# Patient Record
Sex: FEMALE | Race: WHITE | NOT HISPANIC OR LATINO | Employment: OTHER | URBAN - METROPOLITAN AREA
[De-identification: names, ages, dates, MRNs, and addresses within clinical notes are randomized per-mention and may not be internally consistent; named-entity substitution may affect disease eponyms.]

---

## 2017-01-19 ENCOUNTER — APPOINTMENT (OUTPATIENT)
Dept: LAB | Facility: HOSPITAL | Age: 67
End: 2017-01-19
Attending: INTERNAL MEDICINE

## 2017-01-19 DIAGNOSIS — I10 ESSENTIAL HYPERTENSION: ICD-10-CM

## 2017-01-19 DIAGNOSIS — Z11.59 NEED FOR HEPATITIS C SCREENING TEST: ICD-10-CM

## 2017-01-19 DIAGNOSIS — E53.9 VITAMIN B DEFICIENCY: ICD-10-CM

## 2017-01-19 DIAGNOSIS — E55.9 VITAMIN D DEFICIENCY: ICD-10-CM

## 2017-01-19 DIAGNOSIS — E78.5 DYSLIPIDEMIA: ICD-10-CM

## 2017-01-19 DIAGNOSIS — E78.00 PURE HYPERCHOLESTEROLEMIA: Primary | ICD-10-CM

## 2017-01-19 DIAGNOSIS — F41.1 GENERALIZED ANXIETY DISORDER: ICD-10-CM

## 2017-01-19 DIAGNOSIS — K58.9 IRRITABLE BOWEL SYNDROME WITHOUT DIARRHEA: ICD-10-CM

## 2017-01-19 DIAGNOSIS — R73.9 HYPERGLYCEMIA: ICD-10-CM

## 2017-01-19 LAB
25(OH)D3 SERPL-MCNC: 66 NG/ML
ALBUMIN SERPL-MCNC: 4.4 G/DL (ref 3.2–4.8)
ALBUMIN/GLOB SERPL: 1.7 G/DL (ref 1.5–2.5)
ALP SERPL-CCNC: 82 U/L (ref 25–100)
ALT SERPL W P-5'-P-CCNC: 29 U/L (ref 7–40)
ANION GAP SERPL CALCULATED.3IONS-SCNC: 8 MMOL/L (ref 3–11)
ARTICHOKE IGE QN: 135 MG/DL (ref 0–130)
AST SERPL-CCNC: 23 U/L (ref 0–33)
BASOPHILS # BLD AUTO: 0.01 10*3/MM3 (ref 0–0.2)
BASOPHILS NFR BLD AUTO: 0.2 % (ref 0–1)
BILIRUB SERPL-MCNC: 0.6 MG/DL (ref 0.3–1.2)
BUN BLD-MCNC: 20 MG/DL (ref 9–23)
BUN/CREAT SERPL: 33.3 (ref 7–25)
CALCIUM SPEC-SCNC: 10.7 MG/DL (ref 8.7–10.4)
CHLORIDE SERPL-SCNC: 104 MMOL/L (ref 99–109)
CHOLEST SERPL-MCNC: 240 MG/DL (ref 0–200)
CO2 SERPL-SCNC: 31 MMOL/L (ref 20–31)
CREAT BLD-MCNC: 0.6 MG/DL (ref 0.6–1.3)
DEPRECATED RDW RBC AUTO: 44.6 FL (ref 37–54)
EOSINOPHIL # BLD AUTO: 0.07 10*3/MM3 (ref 0.1–0.3)
EOSINOPHIL NFR BLD AUTO: 1.2 % (ref 0–3)
ERYTHROCYTE [DISTWIDTH] IN BLOOD BY AUTOMATED COUNT: 13.9 % (ref 11.3–14.5)
GFR SERPL CREATININE-BSD FRML MDRD: 100 ML/MIN/1.73
GLOBULIN UR ELPH-MCNC: 2.6 GM/DL
GLUCOSE BLD-MCNC: 70 MG/DL (ref 70–100)
HBA1C MFR BLD: 5.5 % (ref 4.8–5.6)
HCT VFR BLD AUTO: 41.7 % (ref 34.5–44)
HCV AB SER DONR QL: NORMAL
HDLC SERPL-MCNC: 68 MG/DL (ref 40–60)
HGB BLD-MCNC: 13.6 G/DL (ref 11.5–15.5)
IMM GRANULOCYTES # BLD: 0.01 10*3/MM3 (ref 0–0.03)
IMM GRANULOCYTES NFR BLD: 0.2 % (ref 0–0.6)
LYMPHOCYTES # BLD AUTO: 1.78 10*3/MM3 (ref 0.6–4.8)
LYMPHOCYTES NFR BLD AUTO: 30.5 % (ref 24–44)
MCH RBC QN AUTO: 28.3 PG (ref 27–31)
MCHC RBC AUTO-ENTMCNC: 32.6 G/DL (ref 32–36)
MCV RBC AUTO: 86.9 FL (ref 80–99)
MONOCYTES # BLD AUTO: 0.55 10*3/MM3 (ref 0–1)
MONOCYTES NFR BLD AUTO: 9.4 % (ref 0–12)
NEUTROPHILS # BLD AUTO: 3.41 10*3/MM3 (ref 1.5–8.3)
NEUTROPHILS NFR BLD AUTO: 58.5 % (ref 41–71)
PLATELET # BLD AUTO: 197 10*3/MM3 (ref 150–450)
PMV BLD AUTO: 12.2 FL (ref 6–12)
POTASSIUM BLD-SCNC: 5.1 MMOL/L (ref 3.5–5.5)
PROT SERPL-MCNC: 7 G/DL (ref 5.7–8.2)
RBC # BLD AUTO: 4.8 10*6/MM3 (ref 3.89–5.14)
SODIUM BLD-SCNC: 143 MMOL/L (ref 132–146)
TRIGL SERPL-MCNC: 65 MG/DL (ref 0–150)
TSH SERPL DL<=0.05 MIU/L-ACNC: 1.29 MIU/ML (ref 0.35–5.35)
WBC NRBC COR # BLD: 5.83 10*3/MM3 (ref 3.5–10.8)

## 2017-01-19 PROCEDURE — 80061 LIPID PANEL: CPT | Performed by: INTERNAL MEDICINE

## 2017-01-19 PROCEDURE — 36415 COLL VENOUS BLD VENIPUNCTURE: CPT | Performed by: INTERNAL MEDICINE

## 2017-01-19 PROCEDURE — 80053 COMPREHEN METABOLIC PANEL: CPT | Performed by: INTERNAL MEDICINE

## 2017-01-19 PROCEDURE — 85025 COMPLETE CBC W/AUTO DIFF WBC: CPT | Performed by: INTERNAL MEDICINE

## 2017-01-19 PROCEDURE — 84443 ASSAY THYROID STIM HORMONE: CPT | Performed by: INTERNAL MEDICINE

## 2017-01-19 PROCEDURE — 86803 HEPATITIS C AB TEST: CPT | Performed by: INTERNAL MEDICINE

## 2017-01-19 PROCEDURE — 83036 HEMOGLOBIN GLYCOSYLATED A1C: CPT | Performed by: INTERNAL MEDICINE

## 2017-01-19 PROCEDURE — 82306 VITAMIN D 25 HYDROXY: CPT | Performed by: INTERNAL MEDICINE

## 2017-01-20 ENCOUNTER — TELEPHONE (OUTPATIENT)
Dept: INTERNAL MEDICINE | Facility: CLINIC | Age: 67
End: 2017-01-20

## 2017-01-20 NOTE — TELEPHONE ENCOUNTER
----- Message from Odilon Akhtar MD sent at 1/20/2017  6:26 AM EST -----  Laboratory tests acceptable and require no change in treatment.

## 2017-02-15 ENCOUNTER — OFFICE VISIT (OUTPATIENT)
Dept: INTERNAL MEDICINE | Facility: CLINIC | Age: 67
End: 2017-02-15

## 2017-02-15 DIAGNOSIS — R41.3 TRANSIENT AMNESIA: Primary | ICD-10-CM

## 2017-02-15 DIAGNOSIS — I10 ESSENTIAL HYPERTENSION: ICD-10-CM

## 2017-02-15 DIAGNOSIS — G43.009 MIGRAINE WITHOUT AURA AND WITHOUT STATUS MIGRAINOSUS, NOT INTRACTABLE: ICD-10-CM

## 2017-02-15 DIAGNOSIS — J01.20 ACUTE NON-RECURRENT ETHMOIDAL SINUSITIS: ICD-10-CM

## 2017-02-15 PROBLEM — J01.90 ACUTE SINUSITIS: Status: ACTIVE | Noted: 2017-02-15

## 2017-02-15 PROCEDURE — 99214 OFFICE O/P EST MOD 30 MIN: CPT | Performed by: INTERNAL MEDICINE

## 2017-02-15 RX ORDER — AMOXICILLIN AND CLAVULANATE POTASSIUM 875; 125 MG/1; MG/1
1 TABLET, FILM COATED ORAL EVERY 12 HOURS
Qty: 14 TABLET | Refills: 0 | Status: SHIPPED | OUTPATIENT
Start: 2017-02-15 | End: 2017-03-10

## 2017-02-15 RX ORDER — POTASSIUM CHLORIDE 20 MEQ/1
20 TABLET, EXTENDED RELEASE ORAL DAILY
Qty: 20 TABLET | Refills: 0 | Status: SHIPPED | OUTPATIENT
Start: 2017-02-15 | End: 2017-03-10

## 2017-02-15 NOTE — PROGRESS NOTES
Subjective   Nicki Kang is a 66 y.o. female.     History of Present Illness     Just before midnight on Monday night the patient developed acute difficulty with memory.  She could not retain simple ideas on conversing with her  at home.  She was able to recognize people but cannot remember times, dates, or situations.  Because this lasted about an hour she went to the [facility] emergency department for further evaluation.  Her workup there including head CT and MRI were negative.  She had no hard neurologic abnormalities and had normal blood tests.  She stated the emergency room all night and was released at about 8:00 in the morning.  She was exhausted through Tuesday and rested a great deal.  Her memory has come back completely.  She had no headaches or other neurologic signs.  The episode did occur one hour after sexual intercourse with her .  She did have a similar episode in 2005 and was told she had transient global amnesia and had a negative neurologic workup then.  She has had no recurrent abnormalities of memory.  She does have recurring migraine headaches but they are currently responding well Tylenol alone.  He has a chronic tendency towards photophobia.  She does take daily verapamil for her hypertension, SVT, and tendency for migraines.    The following portions of the patient's history were reviewed and updated as appropriate: allergies, current medications, past family history, past medical history, past social history, past surgical history and problem list.    Review of Systems   Constitutional: Negative for appetite change and fatigue.   HENT: Negative for ear pain and sore throat.    Respiratory: Negative for cough and shortness of breath.    Cardiovascular: Negative for chest pain and palpitations.   Gastrointestinal: Positive for diarrhea. Negative for abdominal pain and nausea.        Several days of acute diarrheal illness last week.   Musculoskeletal: Negative for  arthralgias and back pain.   Neurological: Positive for headaches. Negative for dizziness.   Psychiatric/Behavioral: Positive for sleep disturbance. Negative for agitation, confusion, dysphoric mood and hallucinations. The patient is nervous/anxious.        Objective   Blood pressure 150/80, pulse 60, temperature 99.4 °F (37.4 °C), temperature source Oral, resp. rate 16, weight 116 lb (52.6 kg), SpO2 98 %.    Physical Exam   Constitutional: She is oriented to person, place, and time. She appears well-developed and well-nourished. No distress.   HENT:   Right Ear: External ear normal.   Left Ear: External ear normal.   Mouth/Throat: Oropharynx is clear and moist.   Eyes: EOM are normal. Pupils are equal, round, and reactive to light.   Moderate photophobia   Neck: Normal range of motion. Neck supple. No JVD present.   Cardiovascular: Normal rate, regular rhythm and normal heart sounds.    Pulmonary/Chest: Effort normal and breath sounds normal. She has no wheezes. She has no rales.   Abdominal: Soft. Bowel sounds are normal. She exhibits no distension. There is no tenderness.   Lymphadenopathy:     She has no cervical adenopathy.   Neurological: She is alert and oriented to person, place, and time. She has normal reflexes. She displays normal reflexes. No cranial nerve deficit. She exhibits normal muscle tone. Coordination normal.   Vibratory normal  Romberg negative  Gait normal  Plantars downgoing     Skin: Skin is warm and dry. No rash noted.   Psychiatric: She has a normal mood and affect. Her behavior is normal. Judgment and thought content normal.   Nursing note and vitals reviewed.    Procedures  Assessment/Plan   Nicki was seen today for memory loss.    Diagnoses and all orders for this visit:    Transient amnesia    Essential hypertension    Migraine without aura and without status migrainosus, not intractable    Acute non-recurrent ethmoidal sinusitis    Other orders  -     potassium chloride  (K-DUR,KLOR-CON) 20 MEQ CR tablet; Take 1 tablet by mouth Daily. Take 2 tablets first day only  -     amoxicillin-clavulanate (AUGMENTIN) 875-125 MG per tablet; Take 1 tablet by mouth Every 12 (Twelve) Hours.      The patient had an episode of transient amnesia on Monday night which went away after several hours.  By history the amnesia was not global.  There is no apparent cause.  It is unclear whether it is associated with  her migraines.  I reviewed her testing from the emergency department which showed no objective findings except for potassium of 3.3..  An EEG and neurologic evaluation appeared to be in order.  As a baseline screening of her mental function we have performed on [place] cognitive assessment today which was fully normal.    The patient has come in today with severe hypertension.  This has improved with repeat.  She has failed multiple antihypertensive medications and I've asked him to monitor this carefully for the next week to ensure that her blood pressure is stable.    The patient does present with hypokalemia which has question association with her acute problem.  Counseled the patient and her  in the exam room today.  I've asked her to replenish her potassium intake for a total of 20 pills and then reevaluate.  She did have a diarrheal illness last week but is been on no diuretics and has no other cause for history for hypokalemia.    Patient Instructions   1.  Resume normal activities - avoid excessive exertion.    2.  Take Potassium tablets - 2 today - and then 1 daily for 18 days.    3.  Plan new neurologic evaluation - speak to the nurse for  scheduling on Friday.    4.  Continue  usual medicines and supplements as listed.    5.  Return April 12 - fasting checkup and wellness exam.    6.  Consider EEG.    7.  Appointment with Burt Hinton M.D.    8.  Blood pressure and heart rate Monday Wednesday Friday and record readings for next visit.    Electronically signed Odilon BOURGEOIS  Giovana SALDAÑA2/17/2017 8:04 AM

## 2017-02-15 NOTE — PATIENT INSTRUCTIONS
1.  Resume normal activities - avoid excessive exertion.    2.  Take Potassium tablets - 2 today - and then 1 daily for 18 days.    3.  Plan new neurologic evaluation - speak to the nurse for  scheduling on Friday.    4.  Continue  usual medicines and supplements as listed.    5.  Return April 12 - fasting checkup and wellness exam.

## 2017-02-17 VITALS
TEMPERATURE: 99.4 F | DIASTOLIC BLOOD PRESSURE: 80 MMHG | OXYGEN SATURATION: 98 % | BODY MASS INDEX: 21.92 KG/M2 | HEART RATE: 60 BPM | SYSTOLIC BLOOD PRESSURE: 150 MMHG | RESPIRATION RATE: 16 BRPM | WEIGHT: 116 LBS

## 2017-02-20 ENCOUNTER — TELEPHONE (OUTPATIENT)
Dept: INTERNAL MEDICINE | Facility: CLINIC | Age: 67
End: 2017-02-20

## 2017-02-20 NOTE — TELEPHONE ENCOUNTER
----- Message from Yari Rondon sent at 2/20/2017 10:06 AM EST -----  Contact: STIVEN BARRY WANTED PATIENT TO CALL AND CHECK IN TO SEE IF SHE NEEDS ANY TESTS ORDERED. SHE IS SEEING NEURO TOMORROW- [phone]

## 2017-02-21 ENCOUNTER — OFFICE VISIT (OUTPATIENT)
Dept: NEUROLOGY | Facility: CLINIC | Age: 67
End: 2017-02-21

## 2017-02-21 VITALS
HEART RATE: 70 BPM | OXYGEN SATURATION: 97 % | BODY MASS INDEX: 22.43 KG/M2 | HEIGHT: 61 IN | DIASTOLIC BLOOD PRESSURE: 84 MMHG | WEIGHT: 118.8 LBS | SYSTOLIC BLOOD PRESSURE: 132 MMHG

## 2017-02-21 DIAGNOSIS — R41.3 TRANSIENT AMNESIA: Primary | ICD-10-CM

## 2017-02-21 PROCEDURE — 99204 OFFICE O/P NEW MOD 45 MIN: CPT | Performed by: PSYCHIATRY & NEUROLOGY

## 2017-02-21 NOTE — PROGRESS NOTES
Subjective:     Patient ID: Nicki Kang is a 66 y.o. female.    History of Present Illness     65 yo woman referred by Dr Akhtar for altered mental status and migraines.    2/13/16 remembers lying down in bed then is downstairs sitting in a chair.  Next she is getting out of car to go to hospital.  Then about 3 hours later awakening on gurney in ED.  Mild HA after event.       Headaches are every other day.  Starts in front and goes to back or in neck to face.  Dull aching discomfort of mild to moderate intensity lasting for 48 hours.  Treating with Tylenol can control sx.  Sensitive to light.  Visual aura.      Discussed with Dr Akhtar for work in to clinic schedule and recommended EEG.     Reviewed Dr Akhtar's and [facility] ED notes :    2/13/16 around 11:50 pm developed acute onset of memory loss.  Sexual intercourse one hour prior to sx.  She was available to recognize people but disorientated for date, time and location.  Sx present for 3 hours.  Presented to [facility] for eval.  Labs reviewed of CBC, CMP. U/A unremarkable.  HCT - normal. MRI Brain - small vessel disease. Sx resolved on discharge.  Previous episode of TGA in 2005.       The following portions of the patient's history were reviewed and updated as appropriate: allergies, current medications, past family history, past medical history, past social history, past surgical history and problem list.    Review of Systems   Constitutional: Positive for fatigue. Negative for activity change and unexpected weight change.   HENT: Negative for tinnitus and trouble swallowing.    Eyes: Negative for photophobia and visual disturbance.   Respiratory: Negative for apnea, cough and choking.    Cardiovascular: Negative for leg swelling.   Gastrointestinal: Negative for nausea and vomiting.   Endocrine: Negative for cold intolerance and heat intolerance.   Genitourinary: Negative for difficulty urinating, frequency, menstrual problem and urgency.   Musculoskeletal:  "Negative for back pain, gait problem, myalgias and neck pain.   Skin: Negative for color change and rash.   Allergic/Immunologic: Negative for immunocompromised state.   Neurological: Positive for headaches. Negative for dizziness, tremors, seizures, syncope, facial asymmetry, speech difficulty, weakness, light-headedness and numbness.   Hematological: Negative for adenopathy. Does not bruise/bleed easily.   Psychiatric/Behavioral: Positive for confusion. Negative for behavioral problems, decreased concentration, hallucinations and sleep disturbance.        Objective:  Vitals:    02/21/17 1254   BP: 132/84   Pulse: 70   SpO2: 97%   Weight: 118 lb 12.8 oz (53.9 kg)   Height: 61\" (154.9 cm)       Neurologic Exam     Mental Status   Oriented to person, place, and time.   Registration: recalls 3 of 3 objects. Recall at 5 minutes: recalls 3 of 3 objects. Follows 3 step commands.   Attention: normal. Concentration: normal.   Speech: speech is normal   Level of consciousness: alert  Knowledge: good and consistent with education.   Able to name object. Able to read. Able to repeat. Able to write. Normal comprehension.     Cranial Nerves     CN II   Visual fields full to confrontation.   Visual acuity: normal  Right visual field deficit: none  Left visual field deficit: none     CN III, IV, VI   Pupils are equal, round, and reactive to light.  Extraocular motions are normal.   Right pupil: Shape: regular. Reactivity: brisk. Consensual response: intact.   Left pupil: Shape: regular. Reactivity: brisk. Consensual response: intact.   Nystagmus: none   Diplopia: none  Ophthalmoparesis: none  Upgaze: normal  Downgaze: normal  Conjugate gaze: present  Vestibulo-ocular reflex: present    CN V   Facial sensation intact.   Right corneal reflex: normal  Left corneal reflex: normal    CN VII   Right facial weakness: none  Left facial weakness: none    CN VIII   Hearing: intact    CN IX, X   Palate: symmetric  Right gag reflex: " normal  Left gag reflex: normal    CN XI   Right sternocleidomastoid strength: normal  Left sternocleidomastoid strength: normal    CN XII   Tongue: not atrophic  Fasciculations: absent  Tongue deviation: none    Motor Exam   Muscle bulk: normal  Overall muscle tone: normal  Right arm tone: normal  Left arm tone: normal  Right leg tone: normal  Left leg tone: normal    Strength   Strength 5/5 throughout.     Sensory Exam   Light touch normal.   Vibration normal.   Proprioception normal.   Pinprick normal.     Gait, Coordination, and Reflexes     Gait  Gait: normal    Coordination   Romberg: negative  Finger to nose coordination: normal  Heel to shin coordination: normal  Tandem walking coordination: normal    Tremor   Resting tremor: absent  Intention tremor: absent  Action tremor: absent    Reflexes   Reflexes 2+ except as noted.       Physical Exam   Constitutional: She is oriented to person, place, and time. Vital signs are normal. She appears well-developed and well-nourished.   HENT:   Head: Normocephalic and atraumatic.   Eyes: EOM and lids are normal. Pupils are equal, round, and reactive to light.   Fundoscopic exam:       The right eye shows no exudate, no hemorrhage and no papilledema. The right eye shows venous pulsations.        The left eye shows no exudate, no hemorrhage and no papilledema. The left eye shows venous pulsations.   Neck: Normal range of motion and phonation normal. Normal carotid pulses present. Carotid bruit is not present. No thyroid mass and no thyromegaly present.   Cardiovascular: Normal rate, regular rhythm and normal heart sounds.    Pulmonary/Chest: Effort normal.   Neurological: She is oriented to person, place, and time. She has normal strength. She has a normal Finger-Nose-Finger Test, a normal Heel to Shin Test, a normal Romberg Test and a normal Tandem Gait Test. Gait normal.   Skin: Skin is warm and dry.   Psychiatric: She has a normal mood and affect. Her speech is normal.    Nursing note and vitals reviewed.      Assessment/Plan:       Problems Addressed this Visit        Other    Transient amnesia - Primary     Suspect TGA.  Will check EEG to evaluate for underlying sz tendency    Advised not to drive for 90 days following event         Relevant Orders    EEG (Hospital Performed)

## 2017-02-21 NOTE — ASSESSMENT & PLAN NOTE
Suspect TGA.  Will check EEG to evaluate for underlying sz tendency    Advised not to drive for 90 days following event

## 2017-03-07 ENCOUNTER — APPOINTMENT (OUTPATIENT)
Dept: NEUROLOGY | Facility: HOSPITAL | Age: 67
End: 2017-03-07
Attending: PSYCHIATRY & NEUROLOGY

## 2017-03-09 ENCOUNTER — HOSPITAL ENCOUNTER (OUTPATIENT)
Dept: NEUROLOGY | Facility: HOSPITAL | Age: 67
Discharge: HOME | End: 2017-03-09
Attending: PSYCHIATRY & NEUROLOGY | Admitting: PSYCHIATRY & NEUROLOGY

## 2017-03-09 PROCEDURE — 95819 EEG AWAKE AND ASLEEP: CPT | Performed by: PSYCHIATRY & NEUROLOGY

## 2017-03-09 PROCEDURE — 95819 EEG AWAKE AND ASLEEP: CPT

## 2017-03-10 ENCOUNTER — OFFICE VISIT (OUTPATIENT)
Dept: NEUROLOGY | Facility: CLINIC | Age: 67
End: 2017-03-10

## 2017-03-10 VITALS
BODY MASS INDEX: 22.24 KG/M2 | WEIGHT: 117.8 LBS | DIASTOLIC BLOOD PRESSURE: 88 MMHG | SYSTOLIC BLOOD PRESSURE: 132 MMHG | OXYGEN SATURATION: 98 % | HEART RATE: 63 BPM | HEIGHT: 61 IN

## 2017-03-10 DIAGNOSIS — R41.3 TRANSIENT AMNESIA: Primary | ICD-10-CM

## 2017-03-10 PROCEDURE — 99213 OFFICE O/P EST LOW 20 MIN: CPT | Performed by: PSYCHIATRY & NEUROLOGY

## 2017-03-10 NOTE — PROGRESS NOTES
Subjective:     Patient ID: Nicki Kang is a 66 y.o. female.    History of Present Illness     65 yo woman returns in follow up for altered mental status and migraines.  Last visit on 2/21/17 ordered EEG.      EEG - rare bitemporal delta theta transients    No further sx of confusion or memory loss.  EEG non diagnostic.       Problem history:    2/13/16 remembers lying down in bed then is downstairs sitting in a chair.  Next she is getting out of car to go to hospital.  Then about 3 hours later awakening on gurney in ED.  Mild HA after event.       Headaches are every other day.  Starts in front and goes to back or in neck to face.  Dull aching discomfort of mild to moderate intensity lasting for 48 hours.  Treating with Tylenol can control sx.  Sensitive to light.  Visual aura.      Discussed with Dr Akhtar for work in to clinic schedule and recommended EEG.     Reviewed Dr Akhtar's and [facility] ED notes :    2/13/16 around 11:50 pm developed acute onset of memory loss.  Sexual intercourse one hour prior to sx.  She was available to recognize people but disorientated for date, time and location.  Sx present for 3 hours.  Presented to [facility] for eval.  Labs reviewed of CBC, CMP. U/A unremarkable.  HCT - normal. MRI Brain - small vessel disease. Sx resolved on discharge.  Previous episode of TGA in 2005.       The following portions of the patient's history were reviewed and updated as appropriate: allergies, current medications, past family history, past medical history, past social history, past surgical history and problem list.    Review of Systems   Constitutional: Negative for activity change and unexpected weight change.   HENT: Negative for tinnitus and trouble swallowing.    Eyes: Negative for photophobia and visual disturbance.   Respiratory: Negative for apnea and choking.    Cardiovascular: Negative for leg swelling.   Endocrine: Negative for cold intolerance and heat intolerance.   Genitourinary:  "Negative for difficulty urinating, frequency, menstrual problem and urgency.   Musculoskeletal: Negative for back pain and gait problem.   Skin: Negative for color change.   Allergic/Immunologic: Negative for immunocompromised state.   Neurological: Negative for dizziness, tremors, seizures, syncope, facial asymmetry, speech difficulty and light-headedness.   Hematological: Negative for adenopathy. Does not bruise/bleed easily.   Psychiatric/Behavioral: Positive for confusion. Negative for behavioral problems, decreased concentration, hallucinations and sleep disturbance.        Objective:  Vitals:    03/10/17 1330   BP: 132/88   Pulse: 63   SpO2: 98%   Weight: 117 lb 12.8 oz (53.4 kg)   Height: 61\" (154.9 cm)       Neurologic Exam     Mental Status   Registration: recalls 3 of 3 objects. Recall at 5 minutes: recalls 3 of 3 objects. Follows 3 step commands.   Attention: normal. Concentration: normal.   Level of consciousness: alert  Knowledge: good and consistent with education.   Able to name object. Able to read. Able to repeat. Able to write. Normal comprehension.     Cranial Nerves     CN II   Visual fields full to confrontation.   Visual acuity: normal  Right visual field deficit: none  Left visual field deficit: none     CN III, IV, VI   Right pupil: Shape: regular. Reactivity: brisk. Consensual response: intact.   Left pupil: Shape: regular. Reactivity: brisk. Consensual response: intact.   Nystagmus: none   Diplopia: none  Ophthalmoparesis: none  Upgaze: normal  Downgaze: normal  Conjugate gaze: present  Vestibulo-ocular reflex: present    CN V   Facial sensation intact.   Right corneal reflex: normal  Left corneal reflex: normal    CN VII   Right facial weakness: none  Left facial weakness: none    CN VIII   Hearing: intact    CN IX, X   Palate: symmetric  Right gag reflex: normal  Left gag reflex: normal    CN XI   Right sternocleidomastoid strength: normal  Left sternocleidomastoid strength: normal    CN " XII   Tongue: not atrophic  Fasciculations: absent  Tongue deviation: none    Motor Exam   Muscle bulk: normal  Overall muscle tone: normal  Right arm tone: normal  Left arm tone: normal  Right leg tone: normal  Left leg tone: normal    Sensory Exam   Light touch normal.   Vibration normal.   Proprioception normal.   Pinprick normal.     Gait, Coordination, and Reflexes     Tremor   Resting tremor: absent  Intention tremor: absent  Action tremor: absent    Reflexes   Reflexes 2+ except as noted.       Physical Exam   Constitutional: She appears well-developed and well-nourished.   Nursing note and vitals reviewed.      Assessment/Plan:       Problems Addressed this Visit        Other    Transient amnesia - Primary     Discussed with pt findings of EEG and diagnosis of TGA.    Testing to date is reassuring.  Recommended caution for driving within 90 days of event.    15 minutes face to face time spent with patient

## 2017-03-10 NOTE — ASSESSMENT & PLAN NOTE
Discussed with pt findings of EEG and diagnosis of TGA.    Testing to date is reassuring.  Recommended caution for driving within 90 days of event.    15 minutes face to face time spent with patient

## 2017-03-20 ENCOUNTER — OFFICE VISIT (OUTPATIENT)
Dept: INTERNAL MEDICINE | Facility: CLINIC | Age: 67
End: 2017-03-20

## 2017-03-20 VITALS
SYSTOLIC BLOOD PRESSURE: 132 MMHG | HEART RATE: 60 BPM | DIASTOLIC BLOOD PRESSURE: 100 MMHG | TEMPERATURE: 98.7 F | RESPIRATION RATE: 16 BRPM | WEIGHT: 119 LBS | OXYGEN SATURATION: 97 % | BODY MASS INDEX: 22.48 KG/M2

## 2017-03-20 DIAGNOSIS — G43.009 MIGRAINE WITHOUT AURA AND WITHOUT STATUS MIGRAINOSUS, NOT INTRACTABLE: ICD-10-CM

## 2017-03-20 DIAGNOSIS — J30.9 ATOPIC RHINITIS: ICD-10-CM

## 2017-03-20 DIAGNOSIS — R41.3 TRANSIENT AMNESIA: ICD-10-CM

## 2017-03-20 DIAGNOSIS — J01.20 ACUTE NON-RECURRENT ETHMOIDAL SINUSITIS: Primary | ICD-10-CM

## 2017-03-20 PROCEDURE — 99213 OFFICE O/P EST LOW 20 MIN: CPT | Performed by: INTERNAL MEDICINE

## 2017-03-20 RX ORDER — METRONIDAZOLE 500 MG/1
500 TABLET ORAL EVERY 12 HOURS
Qty: 14 TABLET | Refills: 0 | Status: SHIPPED | OUTPATIENT
Start: 2017-03-20 | End: 2017-04-18

## 2017-03-20 RX ORDER — DOXYCYCLINE 100 MG/1
100 CAPSULE ORAL EVERY 12 HOURS
Qty: 14 CAPSULE | Refills: 0 | Status: SHIPPED | OUTPATIENT
Start: 2017-03-20 | End: 2017-04-18

## 2017-03-20 NOTE — PROGRESS NOTES
Subjective   Nicki Kang is a 67 y.o. female.     History of Present Illness     The patient was seen one month ago with an episode of transient amnesia.  She had been in the [facility] and has since been evaluating by Dr. Burt Hinton.  No new therapies have been started.    The patient complained of head congestion and drainage during the visit one month ago and was given Augmentin for presumed ethmoid sinusitis.  She feels a congestion really has not improved much.  She has had a history of allergic rhinitis seasonally but has had no sinusitis in recent years.    The following portions of the patient's history were reviewed and updated as appropriate: allergies, current medications, past family history, past medical history, past social history, past surgical history and problem list.    Review of Systems   Constitutional: Positive for fatigue. Negative for appetite change.   HENT: Positive for congestion, sinus pressure and sore throat.    Eyes: Negative for pain and redness.   Respiratory: Negative for cough and shortness of breath.    Cardiovascular: Negative for chest pain and palpitations.   Gastrointestinal: Negative for abdominal distention and nausea.   Neurological: Positive for headaches. Negative for dizziness and light-headedness.       Objective   Blood pressure 132/100, pulse 60, temperature 98.7 °F (37.1 °C), temperature source Oral, resp. rate 16, weight 119 lb (54 kg), SpO2 97 %.    Physical Exam   Constitutional: She is oriented to person, place, and time. She appears well-developed and well-nourished. No distress.   HENT:   Moderate edema and erythema oropharynx with increased redness on the left.  Moderate nasal obstruction on the left greater than right   Eyes: Conjunctivae and EOM are normal. Pupils are equal, round, and reactive to light.   Neck: Normal range of motion. Neck supple. No JVD present.   Cardiovascular: Normal rate, regular rhythm and normal heart sounds.     Pulmonary/Chest: Effort normal and breath sounds normal. She has no wheezes. She has no rales.   Neurological: She is alert and oriented to person, place, and time. She exhibits normal muscle tone. Coordination normal.   Psychiatric: She has a normal mood and affect. Her behavior is normal. Judgment and thought content normal.   Nursing note and vitals reviewed.    Procedures  Assessment/Plan   Nicki was seen today for sinusitis.    Diagnoses and all orders for this visit:    Acute non-recurrent ethmoidal sinusitis    Atopic rhinitis    Migraine without aura and without status migrainosus, not intractable    Transient amnesia    Other orders  -     doxycycline (MONODOX) 100 MG capsule; Take 1 capsule by mouth Every 12 (Twelve) Hours.  -     metroNIDAZOLE (FLAGYL) 500 MG tablet; Take 1 tablet by mouth Every 12 (Twelve) Hours.    The patient has moderate nasal swelling left more than right.  She likely has some degree of allergic flare atorvastatin to work with nasal saline and Nasonex spray.    Her low-grade headache tenderness and pharyngeal changes suggest and ethmoid sinusitis.  We will try her on doxycycline and metronidazole.  If she does not improve she may need a CT of the sinuses and ENT consultation.    Patient Instructions   1.  Start doxycycline and metronidazole - every 12 hours for 7 days.    2.  Use puddings and cereals - as needed to tolerate antibiotics.      3.  Use nasal saline and Nasonex spray - twice daily for the next week.    4.  Call the office in one week - with status report.    5.  Return April 12 - fasting checkup and PME.    6.  Consider CT of sinuses and ENT consultation.    Electronically signed Odilon Akhtar M.D.3/21/2017 6:18 PM

## 2017-04-12 ENCOUNTER — LAB (OUTPATIENT)
Dept: LAB | Facility: HOSPITAL | Age: 67
End: 2017-04-12

## 2017-04-12 ENCOUNTER — OFFICE VISIT (OUTPATIENT)
Dept: INTERNAL MEDICINE | Facility: CLINIC | Age: 67
End: 2017-04-12

## 2017-04-12 VITALS
WEIGHT: 117 LBS | HEART RATE: 66 BPM | RESPIRATION RATE: 16 BRPM | DIASTOLIC BLOOD PRESSURE: 72 MMHG | SYSTOLIC BLOOD PRESSURE: 126 MMHG | HEIGHT: 61 IN | OXYGEN SATURATION: 98 % | TEMPERATURE: 96.8 F | BODY MASS INDEX: 22.09 KG/M2

## 2017-04-12 DIAGNOSIS — G43.009 MIGRAINE WITHOUT AURA AND WITHOUT STATUS MIGRAINOSUS, NOT INTRACTABLE: Primary | ICD-10-CM

## 2017-04-12 DIAGNOSIS — I10 ESSENTIAL HYPERTENSION: ICD-10-CM

## 2017-04-12 DIAGNOSIS — E78.00 PURE HYPERCHOLESTEROLEMIA: ICD-10-CM

## 2017-04-12 DIAGNOSIS — K58.9 IRRITABLE BOWEL SYNDROME WITHOUT DIARRHEA: ICD-10-CM

## 2017-04-12 DIAGNOSIS — G47.33 OBSTRUCTIVE SLEEP APNEA SYNDROME: ICD-10-CM

## 2017-04-12 DIAGNOSIS — R06.02 SHORTNESS OF BREATH: ICD-10-CM

## 2017-04-12 DIAGNOSIS — Z00.00 VISIT FOR PREVENTIVE HEALTH EXAMINATION: ICD-10-CM

## 2017-04-12 DIAGNOSIS — Z23 PNEUMOCOCCAL VACCINATION GIVEN: ICD-10-CM

## 2017-04-12 DIAGNOSIS — R06.02 SHORTNESS OF BREATH: Primary | ICD-10-CM

## 2017-04-12 PROBLEM — J01.90 ACUTE SINUSITIS: Status: RESOLVED | Noted: 2017-02-15 | Resolved: 2017-04-12

## 2017-04-12 LAB
ALBUMIN SERPL-MCNC: 4.7 G/DL (ref 3.2–4.8)
ALBUMIN/GLOB SERPL: 1.6 G/DL (ref 1.5–2.5)
ALP SERPL-CCNC: 77 U/L (ref 25–100)
ALT SERPL W P-5'-P-CCNC: 33 U/L (ref 7–40)
ANION GAP SERPL CALCULATED.3IONS-SCNC: 4 MMOL/L (ref 3–11)
AST SERPL-CCNC: 23 U/L (ref 0–33)
BASOPHILS # BLD AUTO: 0.02 10*3/MM3 (ref 0–0.2)
BASOPHILS NFR BLD AUTO: 0.3 % (ref 0–1)
BILIRUB SERPL-MCNC: 0.6 MG/DL (ref 0.3–1.2)
BNP SERPL-MCNC: 90 PG/ML (ref 0–100)
BUN BLD-MCNC: 19 MG/DL (ref 9–23)
BUN/CREAT SERPL: 27.1 (ref 7–25)
CALCIUM SPEC-SCNC: 10.6 MG/DL (ref 8.7–10.4)
CHLORIDE SERPL-SCNC: 107 MMOL/L (ref 99–109)
CO2 SERPL-SCNC: 31 MMOL/L (ref 20–31)
CREAT BLD-MCNC: 0.7 MG/DL (ref 0.6–1.3)
CRP SERPL-MCNC: <0.01 MG/DL (ref 0–1)
DEPRECATED RDW RBC AUTO: 44.7 FL (ref 37–54)
EOSINOPHIL # BLD AUTO: 0.06 10*3/MM3 (ref 0.1–0.3)
EOSINOPHIL NFR BLD AUTO: 1 % (ref 0–3)
ERYTHROCYTE [DISTWIDTH] IN BLOOD BY AUTOMATED COUNT: 13.7 % (ref 11.3–14.5)
GFR SERPL CREATININE-BSD FRML MDRD: 84 ML/MIN/1.73
GLOBULIN UR ELPH-MCNC: 2.9 GM/DL
GLUCOSE BLD-MCNC: 86 MG/DL (ref 70–100)
HCT VFR BLD AUTO: 45.3 % (ref 34.5–44)
HGB BLD-MCNC: 14.5 G/DL (ref 11.5–15.5)
IMM GRANULOCYTES # BLD: 0.01 10*3/MM3 (ref 0–0.03)
IMM GRANULOCYTES NFR BLD: 0.2 % (ref 0–0.6)
LYMPHOCYTES # BLD AUTO: 1.9 10*3/MM3 (ref 0.6–4.8)
LYMPHOCYTES NFR BLD AUTO: 32.1 % (ref 24–44)
MCH RBC QN AUTO: 28.4 PG (ref 27–31)
MCHC RBC AUTO-ENTMCNC: 32 G/DL (ref 32–36)
MCV RBC AUTO: 88.8 FL (ref 80–99)
MONOCYTES # BLD AUTO: 0.49 10*3/MM3 (ref 0–1)
MONOCYTES NFR BLD AUTO: 8.3 % (ref 0–12)
NEUTROPHILS # BLD AUTO: 3.43 10*3/MM3 (ref 1.5–8.3)
NEUTROPHILS NFR BLD AUTO: 58.1 % (ref 41–71)
PLATELET # BLD AUTO: 192 10*3/MM3 (ref 150–450)
PMV BLD AUTO: 12.2 FL (ref 6–12)
POTASSIUM BLD-SCNC: 4.8 MMOL/L (ref 3.5–5.5)
PROT SERPL-MCNC: 7.6 G/DL (ref 5.7–8.2)
RBC # BLD AUTO: 5.1 10*6/MM3 (ref 3.89–5.14)
SODIUM BLD-SCNC: 142 MMOL/L (ref 132–146)
WBC NRBC COR # BLD: 5.91 10*3/MM3 (ref 3.5–10.8)

## 2017-04-12 PROCEDURE — 90732 PPSV23 VACC 2 YRS+ SUBQ/IM: CPT | Performed by: INTERNAL MEDICINE

## 2017-04-12 PROCEDURE — 83880 ASSAY OF NATRIURETIC PEPTIDE: CPT

## 2017-04-12 PROCEDURE — 85025 COMPLETE CBC W/AUTO DIFF WBC: CPT | Performed by: INTERNAL MEDICINE

## 2017-04-12 PROCEDURE — 36415 COLL VENOUS BLD VENIPUNCTURE: CPT | Performed by: INTERNAL MEDICINE

## 2017-04-12 PROCEDURE — 99214 OFFICE O/P EST MOD 30 MIN: CPT | Performed by: INTERNAL MEDICINE

## 2017-04-12 PROCEDURE — 80053 COMPREHEN METABOLIC PANEL: CPT | Performed by: INTERNAL MEDICINE

## 2017-04-12 PROCEDURE — G0009 ADMIN PNEUMOCOCCAL VACCINE: HCPCS | Performed by: INTERNAL MEDICINE

## 2017-04-12 PROCEDURE — G0438 PPPS, INITIAL VISIT: HCPCS | Performed by: INTERNAL MEDICINE

## 2017-04-12 PROCEDURE — 86140 C-REACTIVE PROTEIN: CPT | Performed by: INTERNAL MEDICINE

## 2017-04-12 NOTE — PROGRESS NOTES
QUICK REFERENCE INFORMATION:  The ABCs of the Annual Wellness Visit    Initial Medicare Wellness Visit    HEALTH RISK ASSESSMENT    1950    Recent Hospitalizations:  No recent hospitalization(s)..        Current Medical Providers:  Patient Care Team:  Odilon Akhtar MD as PCP - General (Internal Medicine)   Dentist - Rayray Lees DMD  Eye - Polina Packer MD  GI - Myrtle Verma MD  Cardio- Mandy Henry MD  OB/GYN - Brian Gee MD            Smoking Status:  History   Smoking Status   • Never Smoker   Smokeless Tobacco   • Never Used       Alcohol Consumption:  History   Alcohol Use No       Depression Screen:   No flowsheet data found.    Health Habits and Functional and Cognitive Screening:  No flowsheet data found.               Does the patient have evidence of cognitive impairment? No    Asiprin use counseling: does not take ASA      Recent Lab Results:    Visual Acuity:  No exam data present    Age-appropriate Screening Schedule:  Refer to the list below for future screening recommendations based on patient's age, sex and/or medical conditions. Orders for these recommended tests are listed in the plan section. The patient has been provided with a written plan.    Health Maintenance   Topic Date Due   • PNEUMOCOCCAL VACCINES (65+ LOW/MEDIUM RISK) (1 of 2 - PCV13) 03/14/2016   • ZOSTER VACCINE  04/27/2016   • INFLUENZA VACCINE  08/01/2016   • LIPID PANEL  01/19/2018   • MAMMOGRAM  06/07/2018   • TDAP/TD VACCINES (2 - Td) 11/18/2024   • COLONOSCOPY  04/01/2026        Subjective   History of Present Illness    Nicki Kang is a 67 y.o. female who presents for an Annual Wellness Visit.    The following portions of the patient's history were reviewed and updated as appropriate: allergies, current medications, past family history, past medical history, past social history, past surgical history and problem list.    Outpatient Medications Prior to Visit   Medication Sig Dispense Refill   •  "acetaminophen (TYLENOL) 325 MG tablet Take 2 tablets by mouth Daily As Needed.     • Artificial Tear Ointment (EYE LUBRICANT OP) Apply 1 drop to eye Every Morning.     • Cholecalciferol (VITAMIN D3) 5000 UNIT/ML liquid Take 1 capsule by mouth.     • doxycycline (MONODOX) 100 MG capsule Take 1 capsule by mouth Every 12 (Twelve) Hours. 14 capsule 0   • Melatonin 1 MG capsule Take 1 capsule by mouth Every Night.     • metroNIDAZOLE (FLAGYL) 500 MG tablet Take 1 tablet by mouth Every 12 (Twelve) Hours. 14 tablet 0   • verapamil SR (CALAN-SR) 120 MG CR tablet TAKE ONE TABLET BY MOUTH ONCE NIGHTLY 90 tablet 0   • vitamin B-12 (CYANOCOBALAMIN) 1000 MCG tablet Take 1 tablet by mouth Daily.       No facility-administered medications prior to visit.        Patient Active Problem List   Diagnosis   • Atopic rhinitis   • Neck pain   • Hyperlipidemia   • Hypertension   • Migraine   • Sleep apnea   • Paroxysmal supraventricular tachycardia   • Restless legs syndrome   • Vitamin B deficiency   • Vitamin D deficiency   • Visit for preventive health examination   • Gastritis   • Irritable bowel syndrome without diarrhea   • Transient amnesia   • Acute sinusitis       Advance Care Planning:  has an advance directive - a copy HAS NOT been provided    Identification of Risk Factors:  Risk factors include: weight , unhealthy diet, chronic pain and polypharmacy.    Review of Systems    Compared to one year ago, the patient feels her physical health is the same.  Compared to one year ago, the patient feels her mental health is the same.    Objective     Physical Exam    Vitals:    04/12/17 0851   BP: 126/72   BP Location: Right arm   Patient Position: Sitting   Cuff Size: Adult   Pulse: 66   Resp: 16   Temp: 96.8 °F (36 °C)   TempSrc: Oral   SpO2: 98%   Weight: 117 lb (53.1 kg)   Height: 61.25\" (155.6 cm)   PainSc: 0-No pain       Body mass index is 21.93 kg/(m^2).  Discussed the patient's BMI with her. The BMI is in the acceptable " range.    Assessment/Plan   Patient Self-Management and Personalized Health Advice  The patient has been provided with information about: diet, exercise and prevention of cardiac or vascular disease and preventive services including:   · Exercise counseling provided, Fall Risk assessment done.    Visit Diagnoses:  No diagnosis found.    No orders of the defined types were placed in this encounter.      Outpatient Encounter Prescriptions as of 4/12/2017   Medication Sig Dispense Refill   • acetaminophen (TYLENOL) 325 MG tablet Take 2 tablets by mouth Daily As Needed.     • Artificial Tear Ointment (EYE LUBRICANT OP) Apply 1 drop to eye Every Morning.     • Cholecalciferol (VITAMIN D3) 5000 UNIT/ML liquid Take 1 capsule by mouth.     • doxycycline (MONODOX) 100 MG capsule Take 1 capsule by mouth Every 12 (Twelve) Hours. 14 capsule 0   • Melatonin 1 MG capsule Take 1 capsule by mouth Every Night.     • metroNIDAZOLE (FLAGYL) 500 MG tablet Take 1 tablet by mouth Every 12 (Twelve) Hours. 14 tablet 0   • verapamil SR (CALAN-SR) 120 MG CR tablet TAKE ONE TABLET BY MOUTH ONCE NIGHTLY 90 tablet 0   • vitamin B-12 (CYANOCOBALAMIN) 1000 MCG tablet Take 1 tablet by mouth Daily.       No facility-administered encounter medications on file as of 4/12/2017.        Reviewed use of high risk medication in the elderly: yes  Reviewed for potential of harmful drug interactions in the elderly: yes    Follow Up:  No Follow-up on file.     An After Visit Summary and PPPS with all of these plans were given to the patient.     The wellness exam has been reviewed in detail.  The patient has been fully counseled on preventative guidelines for vaccines, cancer screenings, and other health maintenance needs.  Functional testing has been performed to assess capacity for independent living and need for other medical interventions.   The patient was counseled on maintaining a lifestyle to promote good health and to minimize chronic diseases.  The  patient has been assisted with scheduling healthcare procedures for the coming year and given a written document outlining these recommendations.    Electronically signed Odilon Akhtar M.D.4/12/2017 2:52 PM

## 2017-04-12 NOTE — PATIENT INSTRUCTIONS
1.  Plan new evaluation of shortness of breath.    2.  Continue usual medicines and supplements - as listed.    3.  Walk 30 minutes every day - for physical fitness.    4.  Monitor blood pressure weekly - goal reading of 130/80.    5.  Follow well-balanced diet - low in salt and low in sugar.    6.  The nurse will call with test results.    7.  Return in August - fasting checkup.

## 2017-04-12 NOTE — PROGRESS NOTES
"Farshad Kang is a 67 y.o. female.     History of Present Illness     The patient's had intermittent dyspnea on exertion to a moderate degree over the last year.  Chest x-ray and probably function tests have been normal.  She has not improved on bronchodilators.  Cardiovascular evaluation over the last 2 years has shown good cardiac function.  Blood pressureto the have improved on verapamil.  She did have a sinus infection last month but has had no appreciable cough or chest congestion.  She has no history of smoking.    The following portions of the patient's history were reviewed and updated as appropriate: allergies, current medications, past family history, past medical history, past social history, past surgical history and problem list.    Review of Systems   Constitutional: Negative for appetite change and fatigue.   HENT: Positive for congestion and sinus pressure. Negative for ear pain and sore throat.         Sinusitis is resolved   Respiratory: Positive for shortness of breath. Negative for cough and wheezing.    Cardiovascular: Negative for chest pain and palpitations.   Gastrointestinal: Positive for abdominal distention. Negative for abdominal pain, constipation, diarrhea and nausea.   Musculoskeletal: Negative for arthralgias and back pain.   Neurological: Positive for headaches. Negative for dizziness and syncope.        Migraines are mild  Transient global amnesia several months ago   Psychiatric/Behavioral: Positive for confusion. Negative for dysphoric mood and sleep disturbance. The patient is nervous/anxious.        Objective   Blood pressure 126/72, pulse 66, temperature 96.8 °F (36 °C), temperature source Oral, resp. rate 16, height 61.25\" (155.6 cm), weight 117 lb (53.1 kg), SpO2 98 %.    Physical Exam   Constitutional: She is oriented to person, place, and time. She appears well-developed and well-nourished. No distress.   HENT:   Right Ear: External ear normal.   Left " Ear: External ear normal.   Mouth/Throat: Oropharynx is clear and moist.   Eyes: EOM are normal. Pupils are equal, round, and reactive to light. No scleral icterus.   Cardiovascular: Normal rate, regular rhythm and normal heart sounds.    Pulmonary/Chest: Effort normal and breath sounds normal. She has no wheezes. She has no rales.   Abdominal: Soft. Bowel sounds are normal. She exhibits no distension. There is no tenderness.   Neurological: She is alert and oriented to person, place, and time. She exhibits normal muscle tone. Coordination normal.   Psychiatric: She has a normal mood and affect. Her behavior is normal. Judgment and thought content normal.   Nursing note and vitals reviewed.    Procedures  Assessment/Plan   Nicki was seen today for annual exam.    Diagnoses and all orders for this visit:    Migraine without aura and without status migrainosus, not intractable    Essential hypertension    Pure hypercholesterolemia    Shortness of breath  -     CBC & Differential  -     Comprehensive Metabolic Panel  -     C-reactive Protein  -     Cancel: BNP  -     CBC Auto Differential    Irritable bowel syndrome without diarrhea    Visit for preventive health examination    Obstructive sleep apnea syndrome    Pneumococcal vaccination given  -     pneumococcal polysaccharide 23-valent (PNEUMOVAX-23) vaccine 0.5 mL; Inject 0.5 mL into the shoulder, thigh, or buttocks During Hospitalization for Immunization.      The patient's dyspnea on exertion has no clear cause.  She may have a subtle degree of bronchospasm.  She may have significant deconditioning now and may respond to pulmonary regurgitation.  Occult disease may be evaluated with a chest CT on low-dose level indication otherwise for this.    The patient does have a history of obstructive sleep apnea.  A sleep study in 2015 was positive.  She has failed the CPAP machine and has been trying an oral appliance.  Still has a marked sleep disturbance which has been  improving with melatonin.    The patient has irritable bowel syndrome with moderate bloating.  She is concerned this may be related to her shortness of breath.  She has significantly improved working with the registered dietitian on the FODMAP diet.    The patient's had 2 prior episodes of total amnesia last occurring this winter.  She has been fully evaluated by the neurologist who feels no other intervention is necessary.  She has been restricted from driving for the next 2 months.    The wellness exam has been reviewed in detail.  The patient has been fully counseled on preventative guidelines for vaccines, cancer screenings, and other health maintenance needs.  Functional testing has been performed to assess capacity for independent living and need for other medical interventions.   The patient was counseled on maintaining a lifestyle to promote good health and to minimize chronic diseases.  The patient has been assisted with scheduling healthcare procedures for the coming year and given a written document outlining these recommendations.    Patient Instructions   1.  Plan new evaluation of shortness of breath.    2.  Continue usual medicines and supplements - as listed.    3.  Walk 30 minutes every day - for physical fitness.    4.  Monitor blood pressure weekly - goal reading of 130/80.    5.  Follow well-balanced diet - low in salt and low in sugar.    6.  The nurse will call with test results.    7.  Return in August - fasting checkup.    8.  Laboratory tests are acceptable and require no change in treatment..    Electronically signed Odilon Akhtar M.D.4/12/2017 2:57 PM

## 2017-04-13 ENCOUNTER — TELEPHONE (OUTPATIENT)
Dept: INTERNAL MEDICINE | Facility: CLINIC | Age: 67
End: 2017-04-13

## 2017-04-13 DIAGNOSIS — G47.30 SLEEP APNEA, UNSPECIFIED TYPE: Primary | ICD-10-CM

## 2017-04-13 DIAGNOSIS — R06.02 SHORTNESS OF BREATH: ICD-10-CM

## 2017-04-13 NOTE — TELEPHONE ENCOUNTER
I called pt to f/up on c/o shortness of breath for ~1mo.  Spoke w/ spouse.  Referral to Biju Mueller for general pulmonary consult sent per TGF.  Dr Mueller will eval pulmonary status and decide re sleep study.  Spouse verb understanding.

## 2017-04-17 ENCOUNTER — TELEPHONE (OUTPATIENT)
Dept: INTERNAL MEDICINE | Facility: CLINIC | Age: 67
End: 2017-04-17

## 2017-04-18 ENCOUNTER — OFFICE VISIT (OUTPATIENT)
Dept: PULMONOLOGY | Facility: CLINIC | Age: 67
End: 2017-04-18

## 2017-04-18 VITALS
DIASTOLIC BLOOD PRESSURE: 66 MMHG | OXYGEN SATURATION: 98 % | HEART RATE: 76 BPM | RESPIRATION RATE: 16 BRPM | SYSTOLIC BLOOD PRESSURE: 118 MMHG | BODY MASS INDEX: 22.28 KG/M2 | WEIGHT: 118 LBS | HEIGHT: 61 IN | TEMPERATURE: 98 F

## 2017-04-18 DIAGNOSIS — R06.00 DYSPNEA, UNSPECIFIED TYPE: Primary | ICD-10-CM

## 2017-04-18 PROCEDURE — 94726 PLETHYSMOGRAPHY LUNG VOLUMES: CPT | Performed by: INTERNAL MEDICINE

## 2017-04-18 PROCEDURE — 94729 DIFFUSING CAPACITY: CPT | Performed by: INTERNAL MEDICINE

## 2017-04-18 PROCEDURE — 94375 RESPIRATORY FLOW VOLUME LOOP: CPT | Performed by: INTERNAL MEDICINE

## 2017-04-18 PROCEDURE — 94620 PR PULMONARY STRESS TESTING,SIMPLE: CPT | Performed by: INTERNAL MEDICINE

## 2017-04-18 PROCEDURE — 99204 OFFICE O/P NEW MOD 45 MIN: CPT | Performed by: INTERNAL MEDICINE

## 2017-04-18 NOTE — PATIENT INSTRUCTIONS
Shortness of Breath  Shortness of breath means you have trouble breathing. It could also mean that you have a medical problem. You should get immediate medical care for shortness of breath.  CAUSES   · Not enough oxygen in the air such as with high altitudes or a smoke-filled room.  · Certain lung diseases, infections, or problems.  · Heart disease or conditions, such as angina or heart failure.  · Low red blood cells (anemia).  · Poor physical fitness, which can cause shortness of breath when you exercise.  · Chest or back injuries or stiffness.  · Being overweight.  · Smoking.  · Anxiety, which can make you feel like you are not getting enough air.  DIAGNOSIS   Serious medical problems can often be found during your physical exam. Tests may also be done to determine why you are having shortness of breath. Tests may include:  · Chest X-rays.  · Lung function tests.  · Blood tests.  · An electrocardiogram (ECG).  · An ambulatory electrocardiogram. An ambulatory ECG records your heartbeat patterns over a 24-hour period.  · Exercise testing.  · A transthoracic echocardiogram (TTE). During echocardiography, sound waves are used to evaluate how blood flows through your heart.  · A transesophageal echocardiogram (WARREN).  · Imaging scans.  Your health care provider may not be able to find a cause for your shortness of breath after your exam. In this case, it is important to have a follow-up exam with your health care provider as directed.   TREATMENT   Treatment for shortness of breath depends on the cause of your symptoms and can vary greatly.  HOME CARE INSTRUCTIONS   · Do not smoke. Smoking is a common cause of shortness of breath. If you smoke, ask for help to quit.  · Avoid being around chemicals or things that may bother your breathing, such as paint fumes and dust.  · Rest as needed. Slowly resume your usual activities.  · If medicines were prescribed, take them as directed for the full length of time directed. This  includes oxygen and any inhaled medicines.  · Keep all follow-up appointments as directed by your health care provider.  SEEK MEDICAL CARE IF:   · Your condition does not improve in the time expected.  · You have a hard time doing your normal activities even with rest.  · You have any new symptoms.  SEEK IMMEDIATE MEDICAL CARE IF:   · Your shortness of breath gets worse.  · You feel light-headed, faint, or develop a cough not controlled with medicines.  · You start coughing up blood.  · You have pain with breathing.  · You have chest pain or pain in your arms, shoulders, or abdomen.  · You have a fever.  · You are unable to walk up stairs or exercise the way you normally do.  MAKE SURE YOU:  · Understand these instructions.  · Will watch your condition.  · Will get help right away if you are not doing well or get worse.     This information is not intended to replace advice given to you by your health care provider. Make sure you discuss any questions you have with your health care provider.     Document Released: 09/12/2002 Document Revised: 12/23/2014 Document Reviewed: 03/04/2013  [employer] Patient Education ©2016 [employer]

## 2017-04-18 NOTE — PROGRESS NOTES
Subjective   Nicki Kang is a 67 y.o. female.  Who is referred by Dr. Akhtar for evaluation of complaints of dyspnea.    History of Present Illness   Patient complains of having sensation at times that she just can't take a deep breath.  She noted it for roughly the past 18 months.  She notes this at rest.  She denies having pain.  She denies having any wheezing.  She says she's noted at roughly every day.  She says she notes it and she first starts eating often.  She denies much exercise limitations.  She says she sometimes just tries to relax and that seems to be better she also notes she is better when she raises her arms above her head.  She is estimate she could walk on flat ground for probably 20 minutes.  She says she can climb 2 flights of steps.  She denies any fevers chills cough or sputum production.  She is a lifelong nonsmoker.  She denies any occupational exposure history.  She denies any family history of lung disease.    She has a history of paroxysmal atrial tachycardia.  She's been followed by Dr. Henry.  Her last echo roughly 2 years ago was fairly unremarkable.  She had a history of pericarditis at age 22.  She has a history of sleep apnea and uses an oral appliance she denies any recent nocturnal hypoxemia.  She's had a history of hypertension in the past year.  She had a sinus infection.  Much of this past winter.  She has a history of migraines and under therapy.  She has a history of restless leg syndrome was on medications in the past.  She has a history of neck injury had an episode of transient amnesia last November.  Currently workup from that is been fairly unremarkable.    Past medical history surgeries she had breast biopsy she had a cholecystectomy    Allergies are to Feldene Paxil losartan codeine and decongestants    Habits: Smoking: Without    Ethanol: Without    Caffeine: Without    Family history is positive for diabetes and valvular heart disease include  "allergies.  The following portions of the patient's history were reviewed and updated as appropriate: allergies, current medications, past family history, past medical history, past social history, past surgical history and problem list.    Review of Systems   Constitutional: Negative.    HENT: Positive for congestion, postnasal drip and sinus pressure.    Eyes: Positive for photophobia.   Respiratory: Positive for shortness of breath.    Cardiovascular: Positive for palpitations.   Gastrointestinal: Negative.    Genitourinary: Negative.    Musculoskeletal: Positive for arthralgias.   Skin: Negative.    Allergic/Immunologic: Negative.    Neurological: Positive for headaches.   Hematological: Negative.    Psychiatric/Behavioral: The patient is nervous/anxious.        Objective  /66  Pulse 76  Temp 98 °F (36.7 °C)  Resp 16  Ht 61\" (154.9 cm)  Wt 118 lb (53.5 kg)  SpO2 98% Comment: RA  BMI 22.3 kg/m2    Physical Exam   Constitutional: She is oriented to person, place, and time. She appears well-developed and well-nourished.   HENT:   Head: Normocephalic and atraumatic.   SHe has Mallampati class III anatomy   Eyes: EOM are normal. Pupils are equal, round, and reactive to light.   Neck: Normal range of motion. Neck supple.   Cardiovascular: Normal rate, regular rhythm and normal heart sounds.    Pulmonary/Chest: Effort normal and breath sounds normal.   Abdominal: Soft. Bowel sounds are normal.   Musculoskeletal: Normal range of motion. She exhibits no edema.   Neurological: She is alert and oriented to person, place, and time.   Skin: Skin is warm and dry.   Psychiatric: She has a normal mood and affect. Her behavior is normal.    pulmonary function test were obtained with good effort.  She had normal spirometry normal lung volumes and normal diffusion capacity.  6 minute walk was obtained with good effort.  She had maintained oxygenation.  She had a normal heart rate and blood pressure response.  It " appeared to be a normal study.  Her chest x-ray from September showed no evidence of active cardiopulmonary disease.  We just have the report from chest x-ray from February but it showed no active disease as well.    Assessment/Plan   Nicki was seen today for shortness of breath.    Diagnoses and all orders for this visit:    Dyspnea, unspecified type  -     XR Chest PA & Lateral  -     Pulmonary Function Test  -     Walking Oximetry; Future      Patient complains of sensation that she cannot take a deep breath.  She admits that she has had some stresses over the past year but says she's feeling better with that respect now.  She has apparently normal chest x-ray normal PFTs and did a normal 6 minute walk.  Her last echocardiogram appeared relatively normal.  He apparently has some obstructive sleep apnea but says she has good control with her oral appliance.  At this point think I would just recommend we follow her clinically.  We could consider medications to treat for anxiety.  We considered a bronchodilator inhaler to use if she was symptomatic but she has issues of PAT and did not wish to try that.  We will see her back in roughly 3 months.  She is to contact us earlier symptoms worsen.    Biju Mueller MD [facility]  Sleep Medicine  Pulmonary and Critical Care Medicine

## 2017-04-21 ENCOUNTER — TELEPHONE (OUTPATIENT)
Dept: INTERNAL MEDICINE | Facility: CLINIC | Age: 67
End: 2017-04-21

## 2017-04-21 NOTE — TELEPHONE ENCOUNTER
----- Message from Yari Rondon sent at 4/21/2017  1:16 PM EDT -----  Contact: STIVEN  PATIENT SAW DR. PATEL YESTERDAY AND HE COULDN'T FIND ANYTHING, BUT PATIENT IS FEELING WORSE. WHAT IS THE NEXT STEP? SHE IS ALSO HAVING STOMACH PROBLEMS- BURNING, THINKS MAYBE ULCER- [phone]    Pt notified TGF has seen report per Dr Patel; pt needs to come for OX soon to address stomach problems per TGF

## 2017-04-24 ENCOUNTER — OFFICE VISIT (OUTPATIENT)
Dept: INTERNAL MEDICINE | Facility: CLINIC | Age: 67
End: 2017-04-24

## 2017-04-24 VITALS
WEIGHT: 118 LBS | SYSTOLIC BLOOD PRESSURE: 150 MMHG | TEMPERATURE: 97.8 F | OXYGEN SATURATION: 98 % | BODY MASS INDEX: 22.3 KG/M2 | HEART RATE: 64 BPM | DIASTOLIC BLOOD PRESSURE: 100 MMHG | RESPIRATION RATE: 16 BRPM

## 2017-04-24 DIAGNOSIS — I10 ESSENTIAL HYPERTENSION: Primary | ICD-10-CM

## 2017-04-24 DIAGNOSIS — K58.9 IRRITABLE BOWEL SYNDROME WITHOUT DIARRHEA: ICD-10-CM

## 2017-04-24 DIAGNOSIS — K29.30 CHRONIC SUPERFICIAL GASTRITIS WITHOUT BLEEDING: ICD-10-CM

## 2017-04-24 PROBLEM — Z00.00 PREVENTATIVE HEALTH CARE: Status: ACTIVE | Noted: 2017-04-24

## 2017-04-24 PROCEDURE — 99213 OFFICE O/P EST LOW 20 MIN: CPT | Performed by: INTERNAL MEDICINE

## 2017-04-24 RX ORDER — RANITIDINE 150 MG/1
150 TABLET ORAL EVERY 12 HOURS
Qty: 60 TABLET | Refills: 5 | Status: SHIPPED | OUTPATIENT
Start: 2017-04-24 | End: 2017-05-05 | Stop reason: SINTOL

## 2017-04-24 RX ORDER — MAGNESIUM HYDROXIDE/ALUMINUM HYDROXICE/SIMETHICONE 120; 1200; 1200 MG/30ML; MG/30ML; MG/30ML
15 SUSPENSION ORAL DAILY PRN
COMMUNITY
End: 2019-02-12

## 2017-04-24 NOTE — PROGRESS NOTES
Subjective   Nicki Kang is a 67 y.o. female.     History of Present Illness     The patient's had several months of persistent dyspnea on exertion.  She's undergone exercise testing and pulmonary function testing which shows no significant abnormalities.  She has recently been seen by the pulmonologist who feels her pulmonary status is normal.  She has never used inhalers.  She has had no cough chest pains or palpitations recently.  She does have a history of hypertension and is brought in multiple blood pressure readings lately that are averaging 130/70.  He also has a history of PAT which is improved on verapamil.    The following portions of the patient's history were reviewed and updated as appropriate: allergies, current medications, past family history, past medical history, past social history, past surgical history and problem list.    Review of Systems   Constitutional: Negative for appetite change and fatigue.   Respiratory: Positive for shortness of breath. Negative for cough and wheezing.    Cardiovascular: Negative for chest pain and palpitations.   Gastrointestinal: Negative for abdominal distention and nausea.   Neurological: Positive for headaches. Negative for dizziness and light-headedness.       Objective   Blood pressure 150/100, pulse 64, temperature 97.8 °F (36.6 °C), temperature source Oral, resp. rate 16, weight 118 lb (53.5 kg), SpO2 98 %.    Physical Exam   Constitutional: She is oriented to person, place, and time. She appears well-developed and well-nourished. No distress.   HENT:   Right Ear: External ear normal.   Left Ear: External ear normal.   Mouth/Throat: Oropharynx is clear and moist.   Cardiovascular: Normal rate, regular rhythm and normal heart sounds.    Pulmonary/Chest: Effort normal and breath sounds normal. She has no wheezes. She has no rales.   Neurological: She is alert and oriented to person, place, and time. She exhibits normal muscle tone. Coordination normal.  "  Psychiatric: She has a normal mood and affect. Her behavior is normal. Judgment and thought content normal.   Nursing note and vitals reviewed.    Procedures  Assessment/Plan   Nicki was seen today for hypertension.    Diagnoses and all orders for this visit:    Essential hypertension    Chronic superficial gastritis without bleeding    Irritable bowel syndrome without diarrhea    Other orders  -     raNITIdine (ZANTAC) 150 MG tablet; Take 1 tablet by mouth Every 12 (Twelve) Hours.    The patient's dyspnea on exertion may be a very mild bronchospasm.  We've discussed inhalers but she would like to put things off.  I've asked her to work with aerobic training as her best way to improve her pulmonary capacity particularly with activities.    The patient has had more indigestion and heartburn.  I've asked her to work with ranitidine now on a regular basis to the spring.  Because of recurring superficial gastritis I've asked her to stay on ranitidine for long-term treatment this year.      The patient appears to have a significant amount of anxiety about her health.  I've reassured her that extensive testing is indicated no acute or severe problem.  I've encouraged to continue working with a clinical psychologist for stress management.  She has worked extensively with success with Aimee Nicole.    Patient Instructions   1.  Start ranitidine every 12 hours for 6 weeks - then change to bedtime only.    2.  Use Mylanta as needed - for indigestion.    3.  Consider Xopenex 1 puff as needed - for shortness of breath.    4.  Visit [facility] - consider a new aerobic program - 1 hour 3 days weekly.    5.  Continue well-balanced diet - low in salt.    6.  Continue regular stress management - with clinical psychologist.    7.  Return in late June - fasting checkup.      Electronically signed Odilon Akhtar M.D.4/25/2017 7:02 PM          "

## 2017-04-24 NOTE — PATIENT INSTRUCTIONS
"1.  Start ranitidine every 12 hours for 6 weeks - then change to bedtime only.    2.  Use Mylanta as needed - for indigestion.    3.  Consider Xopenex 1 puff as needed - for shortness of breath.    4.  Visit \"[facility] - consider a new aerobic program - 1 hour 3 days weekly.    5.  Continue well-balanced diet - low in salt.    6.  Continue regular stress management - with clinical psychologist.    7.  Return in late June - fasting checkup.  "

## 2017-05-05 ENCOUNTER — TELEPHONE (OUTPATIENT)
Dept: INTERNAL MEDICINE | Facility: CLINIC | Age: 67
End: 2017-05-05

## 2017-06-08 ENCOUNTER — HOSPITAL ENCOUNTER (OUTPATIENT)
Dept: MAMMOGRAPHY | Facility: HOSPITAL | Age: 67
Discharge: HOME | End: 2017-06-08
Attending: OBSTETRICS & GYNECOLOGY | Admitting: OBSTETRICS & GYNECOLOGY

## 2017-06-08 DIAGNOSIS — Z12.31 VISIT FOR SCREENING MAMMOGRAM: ICD-10-CM

## 2017-06-08 PROCEDURE — G0202 SCR MAMMO BI INCL CAD: HCPCS

## 2017-06-08 PROCEDURE — 77063 BREAST TOMOSYNTHESIS BI: CPT | Performed by: RADIOLOGY

## 2017-06-08 PROCEDURE — G0202 SCR MAMMO BI INCL CAD: HCPCS | Performed by: RADIOLOGY

## 2017-06-08 PROCEDURE — 77063 BREAST TOMOSYNTHESIS BI: CPT

## 2017-06-30 ENCOUNTER — OFFICE VISIT (OUTPATIENT)
Dept: INTERNAL MEDICINE | Facility: CLINIC | Age: 67
End: 2017-06-30

## 2017-06-30 ENCOUNTER — APPOINTMENT (OUTPATIENT)
Dept: LAB | Facility: HOSPITAL | Age: 67
End: 2017-06-30

## 2017-06-30 VITALS
HEART RATE: 60 BPM | TEMPERATURE: 97.7 F | BODY MASS INDEX: 22.48 KG/M2 | RESPIRATION RATE: 16 BRPM | OXYGEN SATURATION: 98 % | WEIGHT: 119 LBS

## 2017-06-30 DIAGNOSIS — E78.00 PURE HYPERCHOLESTEROLEMIA: ICD-10-CM

## 2017-06-30 DIAGNOSIS — G25.81 RESTLESS LEGS SYNDROME: ICD-10-CM

## 2017-06-30 DIAGNOSIS — K58.9 IRRITABLE BOWEL SYNDROME WITHOUT DIARRHEA: ICD-10-CM

## 2017-06-30 DIAGNOSIS — I10 ESSENTIAL HYPERTENSION: Primary | ICD-10-CM

## 2017-06-30 DIAGNOSIS — K29.30 CHRONIC SUPERFICIAL GASTRITIS WITHOUT BLEEDING: ICD-10-CM

## 2017-06-30 LAB
ALBUMIN SERPL-MCNC: 4.5 G/DL (ref 3.2–4.8)
ALBUMIN/GLOB SERPL: 1.7 G/DL (ref 1.5–2.5)
ALP SERPL-CCNC: 84 U/L (ref 25–100)
ALT SERPL W P-5'-P-CCNC: 18 U/L (ref 7–40)
ANION GAP SERPL CALCULATED.3IONS-SCNC: 5 MMOL/L (ref 3–11)
ARTICHOKE IGE QN: 162 MG/DL (ref 0–130)
AST SERPL-CCNC: 20 U/L (ref 0–33)
BILIRUB SERPL-MCNC: 0.6 MG/DL (ref 0.3–1.2)
BUN BLD-MCNC: 17 MG/DL (ref 9–23)
BUN/CREAT SERPL: 24.3 (ref 7–25)
CALCIUM SPEC-SCNC: 10.6 MG/DL (ref 8.7–10.4)
CHLORIDE SERPL-SCNC: 104 MMOL/L (ref 99–109)
CHOLEST SERPL-MCNC: 242 MG/DL (ref 0–200)
CO2 SERPL-SCNC: 31 MMOL/L (ref 20–31)
CREAT BLD-MCNC: 0.7 MG/DL (ref 0.6–1.3)
GFR SERPL CREATININE-BSD FRML MDRD: 84 ML/MIN/1.73
GLOBULIN UR ELPH-MCNC: 2.6 GM/DL
GLUCOSE BLD-MCNC: 78 MG/DL (ref 70–100)
HDLC SERPL-MCNC: 66 MG/DL (ref 40–60)
POTASSIUM BLD-SCNC: 4.6 MMOL/L (ref 3.5–5.5)
PROT SERPL-MCNC: 7.1 G/DL (ref 5.7–8.2)
SODIUM BLD-SCNC: 140 MMOL/L (ref 132–146)
TRIGL SERPL-MCNC: 60 MG/DL (ref 0–150)

## 2017-06-30 PROCEDURE — 80061 LIPID PANEL: CPT | Performed by: INTERNAL MEDICINE

## 2017-06-30 PROCEDURE — 99213 OFFICE O/P EST LOW 20 MIN: CPT | Performed by: INTERNAL MEDICINE

## 2017-06-30 PROCEDURE — 80053 COMPREHEN METABOLIC PANEL: CPT | Performed by: INTERNAL MEDICINE

## 2017-06-30 PROCEDURE — 36415 COLL VENOUS BLD VENIPUNCTURE: CPT | Performed by: INTERNAL MEDICINE

## 2017-07-03 ENCOUNTER — TRANSCRIBE ORDERS (OUTPATIENT)
Dept: MAMMOGRAPHY | Facility: HOSPITAL | Age: 67
End: 2017-07-03

## 2017-07-03 DIAGNOSIS — Z12.31 VISIT FOR SCREENING MAMMOGRAM: Primary | ICD-10-CM

## 2017-07-05 ENCOUNTER — TELEPHONE (OUTPATIENT)
Dept: INTERNAL MEDICINE | Facility: CLINIC | Age: 67
End: 2017-07-05

## 2017-07-05 DIAGNOSIS — L98.9 SKIN ABNORMALITIES: Primary | ICD-10-CM

## 2017-07-05 NOTE — TELEPHONE ENCOUNTER
Called labs to pt. Spoke w/ pt's . Per TGF:  -LDL cholesterol mildly elevated 162. Consider Lipitor 10 mg daily.  -Chem panel is acceptable requires no change in treatment.  -See Olga Yañez MD for dermatology evaluation - [phone].  Spouse expressed understanding.

## 2017-08-24 ENCOUNTER — TELEPHONE (OUTPATIENT)
Dept: INTERNAL MEDICINE | Facility: CLINIC | Age: 67
End: 2017-08-24

## 2017-08-24 NOTE — TELEPHONE ENCOUNTER
----- Message from Matheus Rosenthal sent at 8/24/2017  2:43 PM EDT -----  Contact: zeynep Sauer Complaint:  She's been on verapamil and she's been having trouble, cramping, spinning feeling, fainting spell, etc.  Fay [phone]

## 2017-08-24 NOTE — TELEPHONE ENCOUNTER
Pt says she's been on verapamil 120mg daily for over a year and never felt well on it. Symptoms more intense past several months: dizziness, tired, stomach cramping, heart quivering, almost fainted a month ago. BPs been 128-140/80-85, P 50-55. She's leaving out of town on Sunday for a week.  Per TGF, BPs good; on such low dose verapamil unlikely the cause; advised to maintain herself and come in for ox when back in town. Pt verb understanding and transferred to  to schedule.

## 2017-09-11 ENCOUNTER — TELEPHONE (OUTPATIENT)
Dept: CARDIOLOGY | Facility: CLINIC | Age: 67
End: 2017-09-11

## 2017-09-11 DIAGNOSIS — I47.1 ATRIAL TACHYCARDIA (HCC): ICD-10-CM

## 2017-09-11 DIAGNOSIS — R00.2 PALPITATIONS: Primary | ICD-10-CM

## 2017-09-11 NOTE — TELEPHONE ENCOUNTER
"JUST JAMI.....    PT calls with c/o increased heart palpitations- she states that she knows that she has atrial tachycardia but here lately it is \"different\"than it has been- states that she has a few episodes in the past month where she had \"quivering\"in her chest- one time she got dizzy and almost passed out- usually she can resolve these episodes with a cough- but she is just worried that she may be having some other kin of arrhythmia- especially since she read an article in the news paper this morning taking about Afib and she thinks her symptoms resemble the Afib symptoms mentioned in article-     Will order a 30 day monitor and further recommendations following the results-     Merle in holter department notified that PT should come in for monitor versus waiting for it to mailed, as she is leaving for vacation on 10/7/17  "

## 2017-10-06 DIAGNOSIS — I34.0 MITRAL VALVE INSUFFICIENCY, UNSPECIFIED ETIOLOGY: ICD-10-CM

## 2017-10-06 DIAGNOSIS — I47.1 ATRIAL TACHYCARDIA (HCC): Primary | ICD-10-CM

## 2017-10-06 DIAGNOSIS — I47.20 VT (VENTRICULAR TACHYCARDIA) (HCC): ICD-10-CM

## 2017-10-06 DIAGNOSIS — Q23.1 BICUSPID AORTIC VALVE: ICD-10-CM

## 2017-10-06 DIAGNOSIS — I49.3 FREQUENT PVCS: ICD-10-CM

## 2017-10-06 DIAGNOSIS — R07.89 CHEST DISCOMFORT: ICD-10-CM

## 2017-10-06 DIAGNOSIS — R00.2 PALPITATIONS: ICD-10-CM

## 2017-10-18 ENCOUNTER — HOSPITAL ENCOUNTER (OUTPATIENT)
Dept: CARDIOLOGY | Facility: HOSPITAL | Age: 67
Discharge: HOME | End: 2017-10-18
Attending: INTERNAL MEDICINE | Admitting: INTERNAL MEDICINE

## 2017-10-18 VITALS — WEIGHT: 117 LBS | HEIGHT: 61 IN | BODY MASS INDEX: 22.09 KG/M2

## 2017-10-18 DIAGNOSIS — R00.2 PALPITATIONS: ICD-10-CM

## 2017-10-18 DIAGNOSIS — I47.20 VT (VENTRICULAR TACHYCARDIA) (HCC): ICD-10-CM

## 2017-10-18 DIAGNOSIS — I49.3 FREQUENT PVCS: ICD-10-CM

## 2017-10-18 DIAGNOSIS — I47.1 ATRIAL TACHYCARDIA (HCC): ICD-10-CM

## 2017-10-18 DIAGNOSIS — I34.0 MITRAL VALVE INSUFFICIENCY, UNSPECIFIED ETIOLOGY: ICD-10-CM

## 2017-10-18 DIAGNOSIS — Q23.1 BICUSPID AORTIC VALVE: ICD-10-CM

## 2017-10-18 DIAGNOSIS — R07.89 CHEST DISCOMFORT: ICD-10-CM

## 2017-10-18 PROCEDURE — 93350 STRESS TTE ONLY: CPT

## 2017-10-18 PROCEDURE — 93017 CV STRESS TEST TRACING ONLY: CPT

## 2017-10-18 PROCEDURE — 93350 STRESS TTE ONLY: CPT | Performed by: INTERNAL MEDICINE

## 2017-10-18 PROCEDURE — 93018 CV STRESS TEST I&R ONLY: CPT | Performed by: INTERNAL MEDICINE

## 2017-10-18 PROCEDURE — 93320 DOPPLER ECHO COMPLETE: CPT

## 2017-10-18 PROCEDURE — 93325 DOPPLER ECHO COLOR FLOW MAPG: CPT

## 2017-10-18 PROCEDURE — 93320 DOPPLER ECHO COMPLETE: CPT | Performed by: INTERNAL MEDICINE

## 2017-10-18 PROCEDURE — 76376 3D RENDER W/INTRP POSTPROCES: CPT

## 2017-10-18 PROCEDURE — 93325 DOPPLER ECHO COLOR FLOW MAPG: CPT | Performed by: INTERNAL MEDICINE

## 2017-10-18 PROCEDURE — 0399T HC MYOCARDL STRAIN IMAG QUAN ASSMT PER SESS: CPT

## 2017-10-19 LAB
AORTIC DIMENSIONLESS INDEX: 0.3 (DI)
AV MEAN GRADIENT POST STRESS: 21 MMHG
AV PEAK GRADIENT POST STRESS: 40 MMHG
AV PEAK VELOCITY POST STRESS: 318 M/S
BH CV ECHO MEAS - AO MAX PG (FULL): 27.6 MMHG
BH CV ECHO MEAS - AO MAX PG: 30.3 MMHG
BH CV ECHO MEAS - AO MEAN PG (FULL): 14.5 MMHG
BH CV ECHO MEAS - AO MEAN PG: 15.8 MMHG
BH CV ECHO MEAS - AO ROOT AREA (BSA CORRECTED): 2.7
BH CV ECHO MEAS - AO ROOT AREA: 13 CM^2
BH CV ECHO MEAS - AO ROOT DIAM: 4.1 CM
BH CV ECHO MEAS - AO V2 MAX: 275.4 CM/SEC
BH CV ECHO MEAS - AO V2 MEAN: 184 CM/SEC
BH CV ECHO MEAS - AO V2 VTI: 58.3 CM
BH CV ECHO MEAS - ASC AORTA: 4.1 CM
BH CV ECHO MEAS - AVA(I,A): 1.2 CM^2
BH CV ECHO MEAS - AVA(I,D): 1.2 CM^2
BH CV ECHO MEAS - AVA(V,A): 1.3 CM^2
BH CV ECHO MEAS - AVA(V,D): 1.3 CM^2
BH CV ECHO MEAS - BSA(HAYCOCK): 1.5 M^2
BH CV ECHO MEAS - BSA: 1.5 M^2
BH CV ECHO MEAS - BZI_BMI: 22.1 KILOGRAMS/M^2
BH CV ECHO MEAS - BZI_METRIC_HEIGHT: 154.9 CM
BH CV ECHO MEAS - BZI_METRIC_WEIGHT: 53.1 KG
BH CV ECHO MEAS - CONTRAST EF (2CH): 76.1 ML/M^2
BH CV ECHO MEAS - CONTRAST EF 4CH: 63.2 ML/M^2
BH CV ECHO MEAS - EDV(MOD-SP2): 67 ML
BH CV ECHO MEAS - EDV(MOD-SP4): 76 ML
BH CV ECHO MEAS - EDV(TEICH): 83.9 ML
BH CV ECHO MEAS - EF(CUBED): 76.1 %
BH CV ECHO MEAS - EF(TEICH): 68.4 %
BH CV ECHO MEAS - ESV(MOD-SP2): 16 ML
BH CV ECHO MEAS - ESV(MOD-SP4): 28 ML
BH CV ECHO MEAS - ESV(TEICH): 26.5 ML
BH CV ECHO MEAS - FS: 38 %
BH CV ECHO MEAS - IVS/LVPW: 1.5
BH CV ECHO MEAS - IVSD: 0.93 CM
BH CV ECHO MEAS - LAT PEAK E' VEL: 8.4 CM/SEC
BH CV ECHO MEAS - LV IVRT: 0.09 SEC
BH CV ECHO MEAS - LV MASS(C)DI: 69 GRAMS/M^2
BH CV ECHO MEAS - LV MAX PG: 2.8 MMHG
BH CV ECHO MEAS - LV MEAN PG: 1.2 MMHG
BH CV ECHO MEAS - LV V1 MAX: 83.4 CM/SEC
BH CV ECHO MEAS - LV V1 MEAN: 52 CM/SEC
BH CV ECHO MEAS - LV V1 VTI: 16.3 CM
BH CV ECHO MEAS - LVIDD: 4.3 CM
BH CV ECHO MEAS - LVIDS: 2.7 CM
BH CV ECHO MEAS - LVLD AP2: 7.1 CM
BH CV ECHO MEAS - LVLD AP4: 8 CM
BH CV ECHO MEAS - LVLS AP2: 6.2 CM
BH CV ECHO MEAS - LVLS AP4: 6.7 CM
BH CV ECHO MEAS - LVOT AREA (M): 4.5 CM^2
BH CV ECHO MEAS - LVOT DIAM: 2.3 CM
BH CV ECHO MEAS - LVPWD: 0.64 CM
BH CV ECHO MEAS - MED PEAK E' VEL: 5.8 CM/SEC
BH CV ECHO MEAS - MV A MAX VEL: 80.9 CM/SEC
BH CV ECHO MEAS - MV DEC TIME: 0.21 SEC
BH CV ECHO MEAS - MV E MAX VEL: 65.6 CM/SEC
BH CV ECHO MEAS - MV E/A: 0.81
BH CV ECHO MEAS - PA ACC SLOPE: 465.4 CM/SEC^2
BH CV ECHO MEAS - PA ACC TIME: 0.13 SEC
BH CV ECHO MEAS - PA PR(ACCEL): 18.4 MMHG
BH CV ECHO MEAS - PI END-D VEL: 92.3 CM/SEC
BH CV ECHO MEAS - PULM A REVS VEL: 26.7 CM/SEC
BH CV ECHO MEAS - PULM DIAS VEL: 43.4 CM/SEC
BH CV ECHO MEAS - PULM S/D: 1.9
BH CV ECHO MEAS - PULM SYS VEL: 83.4 CM/SEC
BH CV ECHO MEAS - RAP SYSTOLE: 3 MMHG
BH CV ECHO MEAS - RVSP: 19 MMHG
BH CV ECHO MEAS - SI(AO): 503.1 ML/M^2
BH CV ECHO MEAS - SI(CUBED): 40.7 ML/M^2
BH CV ECHO MEAS - SI(LVOT): 47.8 ML/M^2
BH CV ECHO MEAS - SI(MOD-SP2): 33.9 ML/M^2
BH CV ECHO MEAS - SI(MOD-SP4): 31.9 ML/M^2
BH CV ECHO MEAS - SI(TEICH): 38.2 ML/M^2
BH CV ECHO MEAS - SV(AO): 756.7 ML
BH CV ECHO MEAS - SV(CUBED): 61.3 ML
BH CV ECHO MEAS - SV(LVOT): 71.9 ML
BH CV ECHO MEAS - SV(TEICH): 57.4 ML
BH CV ECHO MEAS - TR MAX V: 16 MMHG
BH CV ECHO MEAS - TR MAX VEL: 200.4 CM/SEC
BH CV STRESS BP STAGE 1: NORMAL
BH CV STRESS BP STAGE 2: NORMAL
BH CV STRESS DURATION MIN STAGE 1: 3
BH CV STRESS DURATION MIN STAGE 2: 3
BH CV STRESS DURATION MIN STAGE 3: 1
BH CV STRESS DURATION SEC STAGE 1: 0
BH CV STRESS DURATION SEC STAGE 2: 0
BH CV STRESS DURATION SEC STAGE 3: 24
BH CV STRESS GRADE STAGE 1: 10
BH CV STRESS GRADE STAGE 2: 12
BH CV STRESS GRADE STAGE 3: 14
BH CV STRESS HR STAGE 1: 97
BH CV STRESS HR STAGE 2: 121
BH CV STRESS HR STAGE 3: 139
BH CV STRESS METS STAGE 1: 5
BH CV STRESS METS STAGE 2: 7.5
BH CV STRESS METS STAGE 3: 10
BH CV STRESS PROTOCOL 1: NORMAL
BH CV STRESS RECOVERY BP: NORMAL MMHG
BH CV STRESS RECOVERY HR: 93 BPM
BH CV STRESS SPEED STAGE 1: 1.7
BH CV STRESS SPEED STAGE 2: 2.5
BH CV STRESS SPEED STAGE 3: 3.4
BH CV STRESS STAGE 1: 1
BH CV STRESS STAGE 2: 2
BH CV STRESS STAGE 3: 3
BH CV XLRA - RV BASE: 2.4 CM
BH CV XLRA - RV LENGTH: 5.8 CM
BH CV XLRA - RV MID: 1.7 CM
BH CV XLRA - TDI S': 13 CM/SEC
E/E' RATIO: 9.5
IVRT: 92 MSEC
LEFT ATRIUM VOLUME INDEX: 21 ML/M2
LV EF 2D ECHO EST: 70 %
MAXIMAL PREDICTED HEART RATE: 154 BPM
PERCENT MAX PREDICTED HR: 92.21 %
STRESS BASELINE BP: NORMAL MMHG
STRESS BASELINE HR: 70 BPM
STRESS PERCENT HR: 108 %
STRESS POST ESTIMATED WORKLOAD: 10.1 METS
STRESS POST EXERCISE DUR MIN: 7 MIN
STRESS POST EXERCISE DUR SEC: 24 SEC
STRESS POST PEAK BP: NORMAL MMHG
STRESS POST PEAK HR: 142 BPM
STRESS TARGET HR: 131 BPM

## 2017-11-02 ENCOUNTER — TELEPHONE (OUTPATIENT)
Dept: INTERNAL MEDICINE | Facility: CLINIC | Age: 67
End: 2017-11-02

## 2017-11-02 NOTE — TELEPHONE ENCOUNTER
Pt c/o postnasal drng and sinus pressure for ~6 wks. No temp or cough. Using nasal saline & flonase bid and just started mucinex bid.

## 2017-11-02 NOTE — TELEPHONE ENCOUNTER
----- Message from Yari Rondon sent at 11/2/2017  9:20 AM EDT -----  Contact: STIVEN [phone]  FIGHTING A SINUS INFECTION FOR ABOUT 6 WEEKS NOW, SHE HAS BEEN TAKING MUCINEX BUT THERE IS STILL A LOT OF DRAINAGE. SHE WOULD LIKE TO SEE TGF BUT NO APPOINTMENT AVAILABLE.

## 2017-12-14 ENCOUNTER — APPOINTMENT (OUTPATIENT)
Dept: LAB | Facility: HOSPITAL | Age: 67
End: 2017-12-14

## 2017-12-14 ENCOUNTER — OFFICE VISIT (OUTPATIENT)
Dept: INTERNAL MEDICINE | Facility: CLINIC | Age: 67
End: 2017-12-14

## 2017-12-14 DIAGNOSIS — G43.009 MIGRAINE WITHOUT AURA AND WITHOUT STATUS MIGRAINOSUS, NOT INTRACTABLE: ICD-10-CM

## 2017-12-14 DIAGNOSIS — E53.9 VITAMIN B DEFICIENCY: ICD-10-CM

## 2017-12-14 DIAGNOSIS — E55.9 VITAMIN D DEFICIENCY: ICD-10-CM

## 2017-12-14 DIAGNOSIS — I47.1 PAROXYSMAL SUPRAVENTRICULAR TACHYCARDIA (HCC): ICD-10-CM

## 2017-12-14 DIAGNOSIS — E78.00 PURE HYPERCHOLESTEROLEMIA: ICD-10-CM

## 2017-12-14 DIAGNOSIS — E78.6 HDL DEFICIENCY: ICD-10-CM

## 2017-12-14 DIAGNOSIS — I10 ESSENTIAL HYPERTENSION: Primary | ICD-10-CM

## 2017-12-14 DIAGNOSIS — G25.81 RESTLESS LEGS SYNDROME: ICD-10-CM

## 2017-12-14 DIAGNOSIS — K58.9 IRRITABLE BOWEL SYNDROME WITHOUT DIARRHEA: ICD-10-CM

## 2017-12-14 DIAGNOSIS — F41.8 PERFORMANCE ANXIETY: ICD-10-CM

## 2017-12-14 DIAGNOSIS — J30.2 CHRONIC SEASONAL ALLERGIC RHINITIS, UNSPECIFIED TRIGGER: ICD-10-CM

## 2017-12-14 DIAGNOSIS — G47.30 SLEEP APNEA, UNSPECIFIED TYPE: ICD-10-CM

## 2017-12-14 LAB
25(OH)D3 SERPL-MCNC: 50.4 NG/ML
ALBUMIN SERPL-MCNC: 4.6 G/DL (ref 3.2–4.8)
ALBUMIN/GLOB SERPL: 1.7 G/DL (ref 1.5–2.5)
ALP SERPL-CCNC: 92 U/L (ref 25–100)
ALT SERPL W P-5'-P-CCNC: 21 U/L (ref 7–40)
ANION GAP SERPL CALCULATED.3IONS-SCNC: 6 MMOL/L (ref 3–11)
ARTICHOKE IGE QN: 168 MG/DL (ref 0–130)
AST SERPL-CCNC: 21 U/L (ref 0–33)
BACTERIA UR QL AUTO: ABNORMAL /HPF
BILIRUB SERPL-MCNC: 0.5 MG/DL (ref 0.3–1.2)
BILIRUB UR QL STRIP: NEGATIVE
BUN BLD-MCNC: 20 MG/DL (ref 9–23)
BUN/CREAT SERPL: 33.3 (ref 7–25)
CALCIUM SPEC-SCNC: 9.7 MG/DL (ref 8.7–10.4)
CHLORIDE SERPL-SCNC: 104 MMOL/L (ref 99–109)
CHOLEST SERPL-MCNC: 244 MG/DL (ref 0–200)
CLARITY UR: CLEAR
CO2 SERPL-SCNC: 28 MMOL/L (ref 20–31)
COLOR UR: YELLOW
CREAT BLD-MCNC: 0.6 MG/DL (ref 0.6–1.3)
CRP SERPL-MCNC: <0.01 MG/DL (ref 0–1)
GFR SERPL CREATININE-BSD FRML MDRD: 100 ML/MIN/1.73
GLOBULIN UR ELPH-MCNC: 2.7 GM/DL
GLUCOSE BLD-MCNC: 81 MG/DL (ref 70–100)
GLUCOSE UR STRIP-MCNC: NEGATIVE MG/DL
HDLC SERPL-MCNC: 66 MG/DL (ref 40–60)
HGB UR QL STRIP.AUTO: ABNORMAL
HYALINE CASTS UR QL AUTO: ABNORMAL /LPF
KETONES UR QL STRIP: NEGATIVE
LEUKOCYTE ESTERASE UR QL STRIP.AUTO: NEGATIVE
NITRITE UR QL STRIP: NEGATIVE
PH UR STRIP.AUTO: 5.5 [PH] (ref 5–8)
POTASSIUM BLD-SCNC: 3.6 MMOL/L (ref 3.5–5.5)
PROT SERPL-MCNC: 7.3 G/DL (ref 5.7–8.2)
PROT UR QL STRIP: NEGATIVE
RBC # UR: ABNORMAL /HPF
REF LAB TEST METHOD: ABNORMAL
SODIUM BLD-SCNC: 138 MMOL/L (ref 132–146)
SP GR UR STRIP: 1.01 (ref 1–1.03)
SQUAMOUS #/AREA URNS HPF: ABNORMAL /HPF
TRIGL SERPL-MCNC: 63 MG/DL (ref 0–150)
TSH SERPL DL<=0.05 MIU/L-ACNC: 1.2 MIU/ML (ref 0.35–5.35)
UROBILINOGEN UR QL STRIP: ABNORMAL
VIT B12 BLD-MCNC: 1848 PG/ML (ref 211–911)
WBC UR QL AUTO: ABNORMAL /HPF

## 2017-12-14 PROCEDURE — 80061 LIPID PANEL: CPT | Performed by: INTERNAL MEDICINE

## 2017-12-14 PROCEDURE — G0008 ADMIN INFLUENZA VIRUS VAC: HCPCS | Performed by: INTERNAL MEDICINE

## 2017-12-14 PROCEDURE — 81001 URINALYSIS AUTO W/SCOPE: CPT | Performed by: INTERNAL MEDICINE

## 2017-12-14 PROCEDURE — 93000 ELECTROCARDIOGRAM COMPLETE: CPT | Performed by: INTERNAL MEDICINE

## 2017-12-14 PROCEDURE — 82306 VITAMIN D 25 HYDROXY: CPT | Performed by: INTERNAL MEDICINE

## 2017-12-14 PROCEDURE — 84443 ASSAY THYROID STIM HORMONE: CPT | Performed by: INTERNAL MEDICINE

## 2017-12-14 PROCEDURE — 36415 COLL VENOUS BLD VENIPUNCTURE: CPT | Performed by: INTERNAL MEDICINE

## 2017-12-14 PROCEDURE — 90662 IIV NO PRSV INCREASED AG IM: CPT | Performed by: INTERNAL MEDICINE

## 2017-12-14 PROCEDURE — 99215 OFFICE O/P EST HI 40 MIN: CPT | Performed by: INTERNAL MEDICINE

## 2017-12-14 PROCEDURE — 86140 C-REACTIVE PROTEIN: CPT | Performed by: INTERNAL MEDICINE

## 2017-12-14 PROCEDURE — 82607 VITAMIN B-12: CPT | Performed by: INTERNAL MEDICINE

## 2017-12-14 PROCEDURE — 80053 COMPREHEN METABOLIC PANEL: CPT | Performed by: INTERNAL MEDICINE

## 2017-12-14 RX ORDER — GABAPENTIN 100 MG/1
100 CAPSULE ORAL NIGHTLY
Qty: 30 CAPSULE | Refills: 3 | OUTPATIENT
Start: 2017-12-14 | End: 2018-04-24

## 2017-12-14 RX ORDER — PROPRANOLOL HYDROCHLORIDE 10 MG/1
10 TABLET ORAL DAILY PRN
Qty: 30 TABLET | Refills: 0 | Status: SHIPPED | OUTPATIENT
Start: 2017-12-14 | End: 2018-06-03

## 2017-12-14 RX ORDER — GABAPENTIN 100 MG/1
100 CAPSULE ORAL NIGHTLY
Qty: 30 CAPSULE | Refills: 3 | Status: SHIPPED | OUTPATIENT
Start: 2017-12-14 | End: 2017-12-14

## 2017-12-14 NOTE — PROGRESS NOTES
Subjective   Nicki Kang is a 67 y.o. female.     Chief Complaint   Patient presents with   • Hypertension       History of Present Illness     The patient has many years of essential hypertension and has had associated SVT and PAT.  She has had remarkable failures on Ace inhibitors and ARB's.  She has stayed with low-dose verapamil but finds it created a degree of fatigue.  She does follow a low-salt diet and walks daily.  She is maintained a good weight and does not use alcohol.  She does have a family history of stroke in her father and heart disease in her mother both dying prior to 80 years old.    The following portions of the patient's history were reviewed and updated as appropriate: allergies, current medications, past family history, past medical history, past social history, past surgical history and problem list.    Active Ambulatory Problems     Diagnosis Date Noted   • Atopic rhinitis 05/17/2016   • Neck pain 05/17/2016   • Hyperlipidemia 05/17/2016   • Hypertension 05/17/2016   • Migraine 05/17/2016   • Sleep apnea 05/17/2016   • Paroxysmal supraventricular tachycardia 05/17/2016   • Restless legs syndrome 05/17/2016   • Vitamin B deficiency 05/17/2016   • Vitamin D deficiency 05/17/2016   • Gastritis 07/21/2016   • Dyspnea 09/01/2016   • Irritable bowel syndrome without diarrhea 09/22/2016   • Transient amnesia 02/15/2017   • Preventative health care 04/24/2017   • Performance anxiety 12/14/2017     Resolved Ambulatory Problems     Diagnosis Date Noted   • Contusion of hand 05/17/2016   • Palpitations 05/17/2016   • Abdominal discomfort 05/17/2016   • Visit for preventive health examination 07/21/2016   • Acute sinusitis 02/15/2017     Past Medical History:   Diagnosis Date   • Anemia 1956   • BCC (basal cell carcinoma of skin) Remote   • Cervicalgia 2009   • Chronic anxiety    • Chronic gastritis 2016   • Dizziness    • HLD (hyperlipidemia)    • HTN (hypertension) 2013   • Infectious  mononucleosis 1972   • Irritable bowel syndrome 2016   • Migraines Lifelong   • Mononucleosis    • Peptic ulcer    • RLS (restless legs syndrome) 2010   • Sleep apnea 2015   • SVT (supraventricular tachycardia) Adulthood   • Transient global amnesia ,    • Vitamin B deficiency    • Vitamin D deficiency      Past Surgical History:   Procedure Laterality Date   • BASAL CELL CARCINOMA EXCISION  remote    arms   • BREAST BIOPSY Right     MRI   • BREAST EXCISIONAL BIOPSY Bilateral    • BREAST SURGERY  1970    biopsy done - benign   • CHOLECYSTECTOMY  2006   • LASIK  1998    corneal LASIK - persistent dry     Family History   Problem Relation Age of Onset   • Heart failure Mother       age 77   • Diabetes Father    • Stroke Father       age 78   • Arthritis Maternal Grandmother    • Alzheimer's disease Paternal Grandmother    • Dementia Paternal Grandmother    • No Known Problems Sister    • BRCA 1/2 Neg Hx    • Breast cancer Neg Hx    • Ovarian cancer Neg Hx      Social History     Social History   • Marital status:      Spouse name: N/A   • Number of children: N/A   • Years of education: N/A     Occupational History   • Retired           Social History Main Topics   • Smoking status: Never Smoker   • Smokeless tobacco: Never Used   • Alcohol use No   • Drug use: No   • Sexual activity: Not on file     Other Topics Concern   • Not on file     Social History Narrative    Domestic life:   Lives in private home with         Anabaptist:   Sabianism        Sleep hygiene:   6 hours/night        Caffeine use:   None        Exercise habits:   Stationary cycle and PT exercises 30 minutes 6 days weekly - walks several days weekly        Diet:     Well-balanced, no almonds, soy or fish        Occupation:   Retired         Hearing    No impairment        Vision     corrects with reading glasses        Driving     no limitations              Review of Systems  "  Constitutional: Negative for appetite change and fatigue.   HENT: Positive for sinus pressure. Negative for ear pain and sore throat.         Sinusitis in November.  Improved with amoxicillin.   Eyes: Negative for itching and visual disturbance.   Respiratory: Positive for shortness of breath. Negative for cough and wheezing.    Cardiovascular: Positive for palpitations. Negative for chest pain.   Gastrointestinal: Positive for abdominal distention and abdominal pain. Negative for diarrhea and nausea.   Endocrine: Negative for cold intolerance and heat intolerance.   Genitourinary: Negative for dysuria and hematuria.   Musculoskeletal: Negative for arthralgias and back pain.   Skin: Negative for rash and wound.   Allergic/Immunologic: Negative for environmental allergies and food allergies.   Neurological: Positive for headaches. Negative for dizziness.        Rare migraine   Hematological: Negative for adenopathy. Does not bruise/bleed easily.   Psychiatric/Behavioral: Negative for sleep disturbance. The patient is not nervous/anxious.        Objective   Blood pressure 130/80, pulse 56, temperature 98.3 °F (36.8 °C), temperature source Oral, resp. rate 16, height 154.9 cm (61\"), weight 53.5 kg (118 lb), SpO2 98 %.    Physical Exam   Constitutional: She is oriented to person, place, and time. She appears well-developed and well-nourished. No distress.   HENT:   Right Ear: External ear normal.   Left Ear: External ear normal.   Nose: Nose normal.   Mouth/Throat: Oropharynx is clear and moist.   Eyes: EOM are normal. Pupils are equal, round, and reactive to light.   Neck: Normal range of motion. Neck supple. No JVD present. No thyromegaly present.   Cardiovascular: Normal rate, regular rhythm, normal heart sounds and intact distal pulses.    No murmur heard.  Pulmonary/Chest: Effort normal and breath sounds normal. She has no wheezes. She has no rales.   Abdominal: Soft. Bowel sounds are normal. She exhibits no " distension and no mass. There is no tenderness.   Genitourinary:   Genitourinary Comments: Per GYN   Musculoskeletal: Normal range of motion. She exhibits no edema.   Mild osteoarthritis and tenderness of hands   Lymphadenopathy:     She has no cervical adenopathy.   Neurological: She is alert and oriented to person, place, and time. She displays normal reflexes. No cranial nerve deficit. She exhibits normal muscle tone. Coordination normal.   Skin: Skin is warm and dry. No rash noted.   Psychiatric: She has a normal mood and affect. Her behavior is normal. Judgment and thought content normal.   Nursing note and vitals reviewed.      ECG 12 Lead  Date/Time: 12/14/2017 8:55 AM  Performed by: ODILON AKHTAR  Authorized by: ODILON AKHTAR   Interpreted by ED physician: Odilon Akhtar M.D.  Comparison: compared with previous ECG from 12/12/2016  Similar to previous ECG  Rhythm: sinus rhythm  Rate: normal  BPM: 60  Conduction: conduction normal  ST Segments: ST segments normal  T flattening: I, II, III, aVR, aVL, aVF and V1-V6  QRS axis: left  Other: no other findings  Clinical impression: non-specific ECG  Comments: Indication - PAT  Baseline EKG          Assessment/Plan   Nicki was seen today for hypertension.    Diagnoses and all orders for this visit:    Essential hypertension    Migraine without aura and without status migrainosus, not intractable    Paroxysmal supraventricular tachycardia  -     ECG 12 Lead  -     TSH    Irritable bowel syndrome without diarrhea  -     CBC & Differential  -     C-reactive Protein  -     Urinalysis With / Microscopic If Indicated - Urine, Clean Catch  -     Urinalysis, Microscopic Only - Urine, Clean Catch; Future  -     Urinalysis, Microscopic Only - Urine, Clean Catch    Restless legs syndrome    Sleep apnea, unspecified type    Performance anxiety    Pure hypercholesterolemia    Chronic seasonal allergic rhinitis, unspecified trigger    Vitamin B deficiency  -      Vitamin B12    Vitamin D deficiency  -     Vitamin D 25 Hydroxy    HDL deficiency  -     Comprehensive Metabolic Panel  -     Lipid Panel    Other orders  -     Discontinue: gabapentin (NEURONTIN) 100 MG capsule; Take 1 capsule by mouth Every Night.  -     propranolol (INDERAL) 10 MG tablet; Take 1 tablet by mouth Daily As Needed (Prior to performance).  -     Flu Vaccine High Dose PF 65YR+ ([id number])  -     gabapentin (NEURONTIN) 100 MG capsule; Take 1 capsule by mouth Every Night.      The patient's blood pressure has become severely difficult to control.  She in fact had increasing dyspnea and exertion this year and underwent cardiovascular testing.  Her stress echocardiogram showed moderate AR and mild MR.  She did have some degree of oxygen lowering with a 6 minute walk test but kept her O2 sat above 90°.  Her SVT and PAT seems to be responding well to verapamil.    The patient complains of intermittent palpitations associated with performance anxiety.  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  I've counseled her on low-dose propranolol and encouraged her to try this on a regular basis.    The patient has moderate restless legs at night and some degree of sleep impairment.  I've encouraged her to try gabapentin as a calming effect to improve her sleep quality and lower her anxiety level.    The patient has a long remarkable history of migraine headaches.  They in fact have been better this year on verapamil.  I've encouraged to get extra rest to lower stress level.  She should use Excedrin Migraine as needed.    The patient has a marked elevation in LDL cholesterol this year from previous values of 130.  She may benefit from Lipitor.    Patient Instructions   1.  Start gabapentin 100 mg at bedtime - for restless legs.    2.  Increase gabapentin 200 mg or 300 mg - week by week as necessary.    3.  Call the office in 3 weeks - with status report.    4.  Use propranolol 10 mg - 1 hour prior to  performances - repeat as necessary.    5.  Continue usual medicines and supplements - as listed.    6.  Consider guanfacine - for improvement blood pressure control.    7.  Maintain routine physical fitness program - every week.    8.  Return visit March - fasting checkup.    9.  LDL cholesterol mildly high 168.  Discuss prescription medication next visit.    10.  Other laboratory tests are acceptable and require no change in treatment.    Current Outpatient Prescriptions:   •  acetaminophen (TYLENOL) 325 MG tablet, Take 2 tablets by mouth Daily As Needed., Disp: , Rfl:   •  aluminum-magnesium hydroxide-simethicone (MAALOX/MYLANTA) 200-200-20 MG/5ML suspension, Take 15 mL by mouth Daily As Needed., Disp: , Rfl:   •  amoxicillin (AMOXIL) 875 MG tablet, Take 1 tablet by mouth 2 (Two) Times a Day., Disp: 20 tablet, Rfl: 0  •  Artificial Tear Ointment (EYE LUBRICANT OP), Apply 1 drop to eye Every Morning., Disp: , Rfl:   •  Cholecalciferol (VITAMIN D3) 5000 UNIT/ML liquid, Take 1 capsule by mouth., Disp: , Rfl:   •  gabapentin (NEURONTIN) 100 MG capsule, Take 1 capsule by mouth Every Night., Disp: 30 capsule, Rfl: 3  •  propranolol (INDERAL) 10 MG tablet, Take 1 tablet by mouth Daily As Needed (Prior to performance)., Disp: 30 tablet, Rfl: 0  •  verapamil SR (CALAN-SR) 120 MG CR tablet, TAKE ONE TABLET BY MOUTH ONCE NIGHTLY, Disp: 90 tablet, Rfl: 1  •  vitamin B-12 (CYANOCOBALAMIN) 1000 MCG tablet, Take 1 tablet by mouth Daily., Disp: , Rfl:     Allergies   Allergen Reactions   • Fish Allergy Shortness Of Breath   • Lisinopril Palpitations     Throat swelling   • Losartan Shortness Of Breath   • Nuts Shortness Of Breath   • Soybean-Containing Drug Products Shortness Of Breath   • Piroxicam      Lethargy  and speech disturbance   • Isoflavones    • Chlorthalidone      Fatigue and GI distress   • Codeine      Malaise   • Metoprolol      Migraines and fatigue   • Paroxetine Irritability     Panic attack   • Ranitidine GI  Intolerance   • Topiramate      Malaise       Immunization History   Administered Date(s) Administered   • Flu Vaccine High Dose PF 65YR+ 12/14/2017   • Influenza, Quadrivalent 10/02/2015, 10/04/2016   • Pneumococcal Polysaccharide 04/12/2017   • Tdap 11/18/2014     Electronically signed Odilon Akhtar M.D.12/16/2017 8:32 AM

## 2017-12-14 NOTE — PATIENT INSTRUCTIONS
1.  Start gabapentin 100 mg at bedtime - for restless legs.    2.  Increase gabapentin 200 mg or 300 mg - week by week as necessary.    3.  Call the office in 3 weeks - with status report.    4.  Use propranolol 10 mg - 1 hour prior to performances - repeat as necessary.    5.  Continue usual medicines and supplements - as listed.    6.  Consider guanfacine - for improvement blood pressure control.    7.  Maintain routine physical fitness program - every week.    8.  Return visit March - fasting checkup.

## 2017-12-16 VITALS
SYSTOLIC BLOOD PRESSURE: 130 MMHG | OXYGEN SATURATION: 98 % | HEART RATE: 56 BPM | BODY MASS INDEX: 22.28 KG/M2 | TEMPERATURE: 98.3 F | DIASTOLIC BLOOD PRESSURE: 80 MMHG | HEIGHT: 61 IN | WEIGHT: 118 LBS | RESPIRATION RATE: 16 BRPM

## 2017-12-19 ENCOUNTER — TELEPHONE (OUTPATIENT)
Dept: INTERNAL MEDICINE | Facility: CLINIC | Age: 67
End: 2017-12-19

## 2017-12-19 NOTE — TELEPHONE ENCOUNTER
Called labs to pt. Left VM. Per TGF:  -LDL mildly high 168.  Discuss prescription medication next visit.  -Other laboratory tests are acceptable and require no change in treatment.  Call w questions.

## 2017-12-28 ENCOUNTER — TELEPHONE (OUTPATIENT)
Dept: NEUROLOGY | Facility: CLINIC | Age: 67
End: 2017-12-28

## 2017-12-28 NOTE — TELEPHONE ENCOUNTER
Patient called and wanted to make sure that it was safe to take Gabapentin that Dr. Akhtar prescribed for RLS Patient just wanted to follow up with you since you treated her for Transient Amnesia and  She wants to make sure she does not have any additional problems with this condition.

## 2018-01-18 ENCOUNTER — TELEPHONE (OUTPATIENT)
Dept: INTERNAL MEDICINE | Facility: CLINIC | Age: 68
End: 2018-01-18

## 2018-01-18 DIAGNOSIS — K58.9 IRRITABLE BOWEL SYNDROME WITHOUT DIARRHEA: ICD-10-CM

## 2018-01-18 DIAGNOSIS — E78.00 PURE HYPERCHOLESTEROLEMIA: Primary | ICD-10-CM

## 2018-01-18 NOTE — TELEPHONE ENCOUNTER
PATIENT WOULD LIKE AN ORDER TO SEE A DIETICIAN-- DR FLORES HAD SENT HER PREVIOUSLY TO ONE OVER IN [facility] AND SHE WOULD LIKE TO SEE THEM THIS TIME AS WELL BUT NEEDS AN ORDER TO DO SO

## 2018-01-25 ENCOUNTER — APPOINTMENT (OUTPATIENT)
Dept: NUTRITION | Facility: HOSPITAL | Age: 68
End: 2018-01-25

## 2018-04-09 ENCOUNTER — TELEPHONE (OUTPATIENT)
Dept: INTERNAL MEDICINE | Facility: CLINIC | Age: 68
End: 2018-04-09

## 2018-04-09 NOTE — TELEPHONE ENCOUNTER
I called pt. She says she normally goes to PT for her neck and shoulders. But been going for her SI joint since mid Jan. She thinks she's sat on a bad couch for a long time which has caused her SI joint to act up.

## 2018-04-09 NOTE — TELEPHONE ENCOUNTER
Patient is wanting tgf to write an order for PT- she has been going for her SI Joint issue - she is going to [facility]  They  asking for an order to be faxed to them and could we possibly back date it to 1-1-18   Fax number for the order is [phone]

## 2018-04-24 ENCOUNTER — APPOINTMENT (OUTPATIENT)
Dept: LAB | Facility: HOSPITAL | Age: 68
End: 2018-04-24

## 2018-04-24 ENCOUNTER — OFFICE VISIT (OUTPATIENT)
Dept: INTERNAL MEDICINE | Facility: CLINIC | Age: 68
End: 2018-04-24

## 2018-04-24 VITALS
SYSTOLIC BLOOD PRESSURE: 146 MMHG | DIASTOLIC BLOOD PRESSURE: 90 MMHG | HEART RATE: 56 BPM | RESPIRATION RATE: 16 BRPM | OXYGEN SATURATION: 98 % | BODY MASS INDEX: 20.97 KG/M2 | WEIGHT: 111 LBS | TEMPERATURE: 97.5 F

## 2018-04-24 DIAGNOSIS — M54.2 NECK PAIN: Primary | ICD-10-CM

## 2018-04-24 DIAGNOSIS — M54.50 LUMBOSACRAL PAIN: ICD-10-CM

## 2018-04-24 DIAGNOSIS — E78.00 PURE HYPERCHOLESTEROLEMIA: ICD-10-CM

## 2018-04-24 DIAGNOSIS — I10 ESSENTIAL HYPERTENSION: ICD-10-CM

## 2018-04-24 LAB
ALBUMIN SERPL-MCNC: 4.5 G/DL (ref 3.2–4.8)
ALBUMIN/GLOB SERPL: 1.7 G/DL (ref 1.5–2.5)
ALP SERPL-CCNC: 98 U/L (ref 25–100)
ALT SERPL W P-5'-P-CCNC: 35 U/L (ref 7–40)
ANION GAP SERPL CALCULATED.3IONS-SCNC: 7 MMOL/L (ref 3–11)
ARTICHOKE IGE QN: 129 MG/DL (ref 0–130)
AST SERPL-CCNC: 26 U/L (ref 0–33)
BILIRUB SERPL-MCNC: 0.8 MG/DL (ref 0.3–1.2)
BUN BLD-MCNC: 17 MG/DL (ref 9–23)
BUN/CREAT SERPL: 24.3 (ref 7–25)
CALCIUM SPEC-SCNC: 10.1 MG/DL (ref 8.7–10.4)
CHLORIDE SERPL-SCNC: 107 MMOL/L (ref 99–109)
CHOLEST SERPL-MCNC: 198 MG/DL (ref 0–200)
CO2 SERPL-SCNC: 26 MMOL/L (ref 20–31)
CREAT BLD-MCNC: 0.7 MG/DL (ref 0.6–1.3)
GFR SERPL CREATININE-BSD FRML MDRD: 83 ML/MIN/1.73
GLOBULIN UR ELPH-MCNC: 2.6 GM/DL
GLUCOSE BLD-MCNC: 80 MG/DL (ref 70–100)
HDLC SERPL-MCNC: 58 MG/DL (ref 40–60)
POTASSIUM BLD-SCNC: 4.7 MMOL/L (ref 3.5–5.5)
PROT SERPL-MCNC: 7.1 G/DL (ref 5.7–8.2)
SODIUM BLD-SCNC: 140 MMOL/L (ref 132–146)
TRIGL SERPL-MCNC: 59 MG/DL (ref 0–150)

## 2018-04-24 PROCEDURE — 80061 LIPID PANEL: CPT | Performed by: INTERNAL MEDICINE

## 2018-04-24 PROCEDURE — 99213 OFFICE O/P EST LOW 20 MIN: CPT | Performed by: INTERNAL MEDICINE

## 2018-04-24 PROCEDURE — 80053 COMPREHEN METABOLIC PANEL: CPT | Performed by: INTERNAL MEDICINE

## 2018-04-24 PROCEDURE — 36415 COLL VENOUS BLD VENIPUNCTURE: CPT | Performed by: INTERNAL MEDICINE

## 2018-04-24 PROCEDURE — G0439 PPPS, SUBSEQ VISIT: HCPCS | Performed by: INTERNAL MEDICINE

## 2018-04-24 NOTE — PATIENT INSTRUCTIONS
1.  Continue physical therapy - for neck and back rehabilitation.    2.  Continue usual medicines and supplements - as listed.    3.  May eventually need prescription medication - for high cholesterol.    4.  Check blood pressure weekly - goal blood pressure - 130/80.    5.  Return visit 4 months - fasting checkup.

## 2018-04-24 NOTE — PROGRESS NOTES
Subjective   Nicki Kang is a 68 y.o. female.     History of Present Illness     Patient's had several months of persistent neck and shoulder pain.  She has constant headaches and feels she's had a tendency for stiff neck stiffness for long time.  She has had no injuries.  She has been working with physical therapy at this winter in feels she is making progress with muscle relaxation and pain relief.  She has a gentle rehabilitation exercise program at home.    The following portions of the patient's history were reviewed and updated as appropriate: allergies, current medications, past family history, past medical history, past social history, past surgical history and problem list.    Review of Systems   Constitutional: Negative for appetite change and fatigue.   Gastrointestinal: Positive for abdominal distention and abdominal pain. Negative for nausea.   Musculoskeletal: Positive for neck pain and neck stiffness.   Neurological: Positive for headaches. Negative for dizziness and light-headedness.   Psychiatric/Behavioral: Positive for sleep disturbance. The patient is nervous/anxious.        Objective   Blood pressure 146/90, pulse 56, temperature 97.5 °F (36.4 °C), temperature source Oral, resp. rate 16, weight 50.3 kg (111 lb), SpO2 98 %.    Physical Exam   Constitutional: She is oriented to person, place, and time. She appears well-developed and well-nourished. No distress.   Neck: Normal range of motion. No JVD present.   Paracervical and levator  muscles are tight and tender.   Cardiovascular: Normal rate, regular rhythm and normal heart sounds.    Pulmonary/Chest: Effort normal and breath sounds normal. She has no wheezes. She has no rales.   Lymphadenopathy:     She has no cervical adenopathy.   Neurological: She is alert and oriented to person, place, and time. She exhibits normal muscle tone. Coordination normal.   Psychiatric: She has a normal mood and affect. Her behavior is normal. Judgment  and thought content normal.   Nursing note and vitals reviewed.    Procedures  Assessment/Plan   Nicki was seen today for neck pain and annual exam.    Diagnoses and all orders for this visit:    Neck pain    Lumbosacral pain    Pure hypercholesterolemia  -     Comprehensive Metabolic Panel  -     Lipid Panel    Essential hypertension      The patient has moderate paracervical tightness tenderness likely related to deconditioning and generalized stress.  I've encouraged her to make a routine rehabilitation exercise program home on a permanent basis.    Patient's hypertension is in adequate control with average blood pressure readings of 125/80 at home.  She has had difficulty with multiple medications in the past.  Verapamil, salt restriction, and regular walking is her most effective treatment program now.    The patient has chronic migraine headaches.  Verapamil and occasional use of Tylenol have been an effective treatment program.  I've encouraged her to get adequate rest and work on relaxation techniques.    The wellness exam has been reviewed in detail.  The patient has been fully counseled on preventative guidelines for vaccines, cancer screenings, and other health maintenance needs.  Functional testing has been performed to assess capacity for independent living and need for other medical interventions.   The patient was counseled on maintaining a lifestyle to promote good health and to minimize chronic diseases.  The patient has been assisted with scheduling healthcare procedures for the coming year and given a written document outlining these recommendations.    Patient Instructions   1.  Continue physical therapy - for neck and back rehabilitation.    2.  Continue usual medicines and supplements - as listed.    3.  May eventually need prescription medication - for high cholesterol.    4.  Check blood pressure weekly - goal blood pressure - 130/80.    5.  Return visit 4 months - fasting checkup.    6.  LDL  cholesterol mildly elevated 129.  Continue American Heart Association diet guidelines.    7.  Chemistry tests are otherwise acceptable.    Electronically signed Odilon Akhtar M.D.4/27/2018 7:10 AM

## 2018-04-27 NOTE — PROGRESS NOTES
QUICK REFERENCE INFORMATION:  The ABCs of the Annual Wellness Visit    Subsequent Medicare Wellness Visit    HEALTH RISK ASSESSMENT    1950    Recent Hospitalizations:  No hospitalization(s) within the last year..        Current Medical Providers:  Patient Care Team:  Odilon Akhtar MD as PCP - General (Internal Medicine)  Odilon Akhtar MD as PCP - Claims Attributed        Smoking Status:  History   Smoking Status   • Never Smoker   Smokeless Tobacco   • Never Used       Alcohol Consumption:  History   Alcohol Use No       Depression Screen:   PHQ-2/PHQ-9 Depression Screening 4/12/2017   Little interest or pleasure in doing things 0   Feeling down, depressed, or hopeless 0   Trouble falling or staying asleep, or sleeping too much 0   Feeling tired or having little energy 0   Poor appetite or overeating 0   Feeling bad about yourself - or that you are a failure or have let yourself or your family down 0   Trouble concentrating on things, such as reading the newspaper or watching television 0   Moving or speaking so slowly that other people could have noticed. Or the opposite - being so fidgety or restless that you have been moving around a lot more than usual 0   Thoughts that you would be better off dead, or of hurting yourself in some way 0   Total Score 0   If you checked off any problems, how difficult have these problems made it for you to do your work, take care of things at home, or get along with other people? -       Health Habits and Functional and Cognitive Screening:  Functional & Cognitive Status 4/12/2017   Do you have difficulty preparing food and eating? No   Do you have difficulty bathing yourself, getting dressed or grooming yourself? No   Do you have difficulty using the toilet? No   Do you have difficulty moving around from place to place? No   In the past year have you fallen or experienced a near fall? No   Current Diet Well Balanced Diet   Dental Exam Up to date   Eye Exam Up to  date   Exercise (times per week) 4 times per week   Current Exercise Activities Include Walking   Do you need help using the phone?  No   Are you deaf or do you have serious difficulty hearing?  No   Do you need help with transportation? No   Do you need help shopping? No   Do you need help preparing meals?  No   Do you need help with housework?  No   Do you need help with laundry? No   Do you need help taking your medications? No   Do you need help managing money? No           Does the patient have evidence of cognitive impairment? No    Aspirin use counseling: Does not need ASA (and currently is not on it)      Recent Lab Results:  CMP:  Lab Results   Component Value Date    BUN 17 04/24/2018    CREATININE 0.70 04/24/2018    EGFRIFNONA 83 04/24/2018    BCR 24.3 04/24/2018     04/24/2018    K 4.7 04/24/2018    CO2 26.0 04/24/2018    CALCIUM 10.1 04/24/2018    ALBUMIN 4.50 04/24/2018    LABIL2 1.7 04/24/2018    BILITOT 0.8 04/24/2018    ALKPHOS 98 04/24/2018    AST 26 04/24/2018    ALT 35 04/24/2018     Lipid Panel:  Lab Results   Component Value Date    CHOL 198 04/24/2018    TRIG 59 04/24/2018    HDL 58 04/24/2018    LDLDIRECT  06/22/2016      Comment:      Test not performed     HbA1c:  Lab Results   Component Value Date    HGBA1C 5.50 01/19/2017       Visual Acuity:  No exam data present    Age-appropriate Screening Schedule:  Refer to the list below for future screening recommendations based on patient's age, sex and/or medical conditions. Orders for these recommended tests are listed in the plan section. The patient has been provided with a written plan.    Health Maintenance   Topic Date Due   • ZOSTER VACCINE  04/27/2016   • PNEUMOCOCCAL VACCINES (65+ LOW/MEDIUM RISK) (2 of 2 - PCV13) 04/12/2018   • INFLUENZA VACCINE  08/01/2018   • LIPID PANEL  04/24/2019   • MAMMOGRAM  06/08/2019   • TDAP/TD VACCINES (2 - Td) 11/18/2024   • COLONOSCOPY  04/01/2026        Subjective   History of Present  Illness    Nicki Kang is a 68 y.o. female who presents for an Subsequent Wellness Visit.    The following portions of the patient's history were reviewed and updated as appropriate: allergies, current medications, past family history, past medical history, past social history, past surgical history and problem list.    Outpatient Medications Prior to Visit   Medication Sig Dispense Refill   • acetaminophen (TYLENOL) 325 MG tablet Take 2 tablets by mouth Daily As Needed.     • aluminum-magnesium hydroxide-simethicone (MAALOX/MYLANTA) 200-200-20 MG/5ML suspension Take 15 mL by mouth Daily As Needed.     • amoxicillin (AMOXIL) 875 MG tablet Take 1 tablet by mouth 2 (Two) Times a Day. 20 tablet 0   • Artificial Tear Ointment (EYE LUBRICANT OP) Apply 1 drop to eye Every Morning.     • Cholecalciferol (VITAMIN D3) 5000 UNIT/ML liquid Take 1 capsule by mouth.     • propranolol (INDERAL) 10 MG tablet Take 1 tablet by mouth Daily As Needed (Prior to performance). 30 tablet 0   • verapamil SR (CALAN-SR) 120 MG CR tablet TAKE ONE TABLET BY MOUTH ONCE NIGHTLY 90 tablet 1   • vitamin B-12 (CYANOCOBALAMIN) 1000 MCG tablet Take 1 tablet by mouth Daily.     • gabapentin (NEURONTIN) 100 MG capsule Take 1 capsule by mouth Every Night. 30 capsule 3     No facility-administered medications prior to visit.        Patient Active Problem List   Diagnosis   • Atopic rhinitis   • Neck pain   • Hyperlipidemia   • Hypertension   • Migraine   • Sleep apnea   • Paroxysmal supraventricular tachycardia   • Restless legs syndrome   • Vitamin B deficiency   • Vitamin D deficiency   • Gastritis   • Irritable bowel syndrome without diarrhea   • Transient amnesia   • Preventative health care   • Performance anxiety   • Lumbosacral pain       Advance Care Planning:  has an advance directive - a copy HAS NOT been provided    Identification of Risk Factors:  Risk factors include: polypharmacy.    Review of Systems    Compared to one year ago,  the patient feels her physical health is the same.  Compared to one year ago, the patient feels her mental health is the same.    Objective     Physical Exam    Vitals:    04/24/18 1138   BP: 146/90   BP Location: Left arm   Patient Position: Sitting   Pulse: 56  Comment: regular   Resp: 16  Comment: normal   Temp: 97.5 °F (36.4 °C)   TempSrc: Oral   SpO2: 98%  Comment: RA   Weight: 50.3 kg (111 lb)       Patient's Body mass index is 20.97 kg/m². BMI is within normal parameters. No follow-up required.      Assessment/Plan   Patient Self-Management and Personalized Health Advice  The patient has been provided with information about: diet, exercise, weight management, prevention of cardiac or vascular disease and fall prevention and preventive services including:   · Exercise counseling provided, Fall Risk assessment done, Fall Risk plan of care done, Nutrition counseling provided, Pneumococcal vaccine , Screening mammography, referral placed.    Visit Diagnoses:    ICD-10-CM ICD-9-CM   1. Neck pain M54.2 723.1   2. Lumbosacral pain M54.5 724.2     724.6   3. Pure hypercholesterolemia E78.00 272.0   4. Essential hypertension I10 401.9       Orders Placed This Encounter   Procedures   • Comprehensive Metabolic Panel   • Lipid Panel       Outpatient Encounter Prescriptions as of 4/24/2018   Medication Sig Dispense Refill   • acetaminophen (TYLENOL) 325 MG tablet Take 2 tablets by mouth Daily As Needed.     • aluminum-magnesium hydroxide-simethicone (MAALOX/MYLANTA) 200-200-20 MG/5ML suspension Take 15 mL by mouth Daily As Needed.     • amoxicillin (AMOXIL) 875 MG tablet Take 1 tablet by mouth 2 (Two) Times a Day. 20 tablet 0   • Artificial Tear Ointment (EYE LUBRICANT OP) Apply 1 drop to eye Every Morning.     • Cholecalciferol (VITAMIN D3) 5000 UNIT/ML liquid Take 1 capsule by mouth.     • propranolol (INDERAL) 10 MG tablet Take 1 tablet by mouth Daily As Needed (Prior to performance). 30 tablet 0   • verapamil SR  (CALAN-SR) 120 MG CR tablet TAKE ONE TABLET BY MOUTH ONCE NIGHTLY 90 tablet 1   • vitamin B-12 (CYANOCOBALAMIN) 1000 MCG tablet Take 1 tablet by mouth Daily.     • [DISCONTINUED] gabapentin (NEURONTIN) 100 MG capsule Take 1 capsule by mouth Every Night. 30 capsule 3     No facility-administered encounter medications on file as of 4/24/2018.        Reviewed use of high risk medication in the elderly: yes  Reviewed for potential of harmful drug interactions in the elderly: yes    Follow Up:  Return in about 4 months (around 8/24/2018) for Fasting, Recheck.     An After Visit Summary and PPPS with all of these plans were given to the patient.        The wellness exam has been reviewed in detail.  The patient has been fully counseled on preventative guidelines for vaccines, cancer screenings, and other health maintenance needs.  Functional testing has been performed to assess capacity for independent living and need for other medical interventions.   The patient was counseled on maintaining a lifestyle to promote good health and to minimize chronic diseases.  The patient has been assisted with scheduling healthcare procedures for the coming year and given a written document outlining these recommendations.    Electronically signed Odilon Akhtar M.D.4/27/2018 7:06 AM

## 2018-04-30 ENCOUNTER — TELEPHONE (OUTPATIENT)
Dept: INTERNAL MEDICINE | Facility: CLINIC | Age: 68
End: 2018-04-30

## 2018-04-30 NOTE — TELEPHONE ENCOUNTER
Called labs.  Per TGF - LDL cholesterol mildly elevated 129.  Continue American Heart Association diet guidelines.  Chemistry tests are otherwise acceptable.  Pt verb understanding.

## 2018-05-24 ENCOUNTER — OFFICE VISIT (OUTPATIENT)
Dept: CARDIOLOGY | Facility: CLINIC | Age: 68
End: 2018-05-24

## 2018-05-24 VITALS
HEART RATE: 65 BPM | DIASTOLIC BLOOD PRESSURE: 78 MMHG | SYSTOLIC BLOOD PRESSURE: 126 MMHG | HEIGHT: 61 IN | WEIGHT: 110.4 LBS | BODY MASS INDEX: 20.84 KG/M2

## 2018-05-24 DIAGNOSIS — I10 ESSENTIAL HYPERTENSION: ICD-10-CM

## 2018-05-24 DIAGNOSIS — E78.2 MIXED HYPERLIPIDEMIA: ICD-10-CM

## 2018-05-24 DIAGNOSIS — R06.02 SHORTNESS OF BREATH: ICD-10-CM

## 2018-05-24 DIAGNOSIS — I20.0 UNSTABLE ANGINA (HCC): Primary | ICD-10-CM

## 2018-05-24 PROBLEM — R07.9 CHEST PAIN: Status: RESOLVED | Noted: 2018-05-24 | Resolved: 2018-05-24

## 2018-05-24 PROBLEM — R07.9 CHEST PAIN: Status: ACTIVE | Noted: 2018-05-24

## 2018-05-24 PROBLEM — I20.8 ANGINA EFFORT: Status: ACTIVE | Noted: 2018-05-24

## 2018-05-24 PROBLEM — I20.8 ANGINA EFFORT: Status: RESOLVED | Noted: 2018-05-24 | Resolved: 2018-05-24

## 2018-05-24 PROCEDURE — 99213 OFFICE O/P EST LOW 20 MIN: CPT | Performed by: INTERNAL MEDICINE

## 2018-05-24 NOTE — PROGRESS NOTES
Nicki Kang  1950  [phone]      05/24/2018    Odilon Akhtar MD    Chief Complaint   Patient presents with   • Paroxysmal supraventricular tachycardia   • Hypertension       Problem List:  1. Paroxysmal atrial  tachycardia (PAT):  a. Diagnosed 1973.  b. Verapamil prescribed  to use p.r.n., but patient discontinued secondary to fatigue.  c. GXT, 05/15/1996, showing PVCs in rest and recovery with no sustained arrhythmias or ischemia.  d. Reported last 24-hour Holter monitor, 1998.  e. Reported stress echo 5 years ago, normal;  data deficit.  f. Event monitor, August 2008, showing several brief runs of atrial tachycardia.  2.  History of questionable bicuspid aortic valve.  a. 2-D echocardiogram,  April 1998, showing moderate thickening of the mitral valve without mitral valve prolapse with mild MR.  The aortic valve was probably tri-leaflet with no thickening with minimal aortic regurgitation and EF of 60%.  b. Echocardiogram, 08/11/2008:  Tri-leaflet  aortic valve with trace AI, mild-to-moderate MR, and mild-to-moderate TR.  LVEF of 55%.  c. Echocardiogram, 07/07/2014:  LVEF of 55% to 60% with mild MR and mild TR, and a trial leaflet appearing aortic valve.   d. Stress echo, 10/18/2017: EF 70%. Mild to moderate aortic valve stenosis. Mild TR. Mild MR. Good exercise tolerance with an expected duration of 6:10 and an actual duration of 7:24. No evidence of inducible ischemia.  3. Dyslipidemia:  Diet controlled.  4. Hypertension  a. Renal artery US, 6/16/2016: No significant renal arterial stenosis. Normal parenchymal blood flow patterns.  5. Pericarditis, 1972.  6. Obstructive sleep apnea.  7. Anxiety.  8. Migraine headaches.  9. Gastroesophageal reflux disease.  10.  Restless leg syndrome.  11. Insomnia.  12. Surgical history:  a. Breast  biopsy.  b. Benign lymph node removal.  c. Cholecystectomy.    Allergies   Allergen Reactions   • Fish Allergy Shortness Of Breath   • Lisinopril Palpitations      Throat swelling   • Losartan Shortness Of Breath   • Nuts Shortness Of Breath   • Soybean-Containing Drug Products Shortness Of Breath   • Piroxicam      Lethargy  and speech disturbance   • Isoflavones    • Chlorthalidone      Fatigue and GI distress   • Codeine      Malaise   • Gabapentin Mental Status Change   • Metoprolol      Migraines and fatigue   • Paroxetine Irritability     Panic attack   • Ranitidine GI Intolerance   • Topiramate      Malaise       Current Medications:      Current Outpatient Prescriptions:   •  acetaminophen (TYLENOL) 325 MG tablet, Take 2 tablets by mouth Daily As Needed., Disp: , Rfl:   •  aluminum-magnesium hydroxide-simethicone (MAALOX/MYLANTA) 200-200-20 MG/5ML suspension, Take 15 mL by mouth Daily As Needed., Disp: , Rfl:   •  Artificial Tear Ointment (EYE LUBRICANT OP), Administer 1 drop to both eyes Every Morning., Disp: , Rfl:   •  Cholecalciferol (VITAMIN D3) 5000 UNIT/ML liquid, Take 1 capsule by mouth Daily., Disp: , Rfl:   •  propranolol (INDERAL) 10 MG tablet, Take 1 tablet by mouth Daily As Needed (Prior to performance). (Patient taking differently: Take 10 mg by mouth As Needed (Prior to performance).), Disp: 30 tablet, Rfl: 0  •  verapamil SR (CALAN-SR) 120 MG CR tablet, TAKE ONE TABLET BY MOUTH ONCE NIGHTLY (Patient taking differently: TAKE ONE TABLET BY MOUTH ONCE MORNING), Disp: 90 tablet, Rfl: 1  •  vitamin B-12 (CYANOCOBALAMIN) 1000 MCG tablet, Take 1 tablet by mouth Daily., Disp: , Rfl:     HPI    Nicki Kang presents today for follow up of chest pain, shortness of breath, hypertension, and dyslipidemia. Since last visit, patient has noted that a few days ago, when she was going for her evening walk, after she got home she developed jaw pain. She states that the pain dissipated after approximately 15 minutes. Patient also notes that for a few years now, she has been struggling to get a deep breathe. She also experiences fatigue and chest heaviness. She  "remembers that she was having issues with shortness of breath when she underwent her stress echo last year. Patient denies edema, PND, orthopnea, dizziness, and syncope. She states that she has adjusted her diet in the past few months to help improve her cholesterol numbers.    The following portions of the patient's history were reviewed and updated as appropriate: allergies, current medications and problem list.    Pertinent positives as listed in the HPI.  All other systems reviewed are negative.    Vitals:    05/24/18 1457   BP: 126/78   BP Location: Right arm   Patient Position: Sitting   Pulse: 65   Weight: 50.1 kg (110 lb 6.4 oz)   Height: 154.9 cm (61\")       Physical Exam:  General: Alert and oriented to person, place, and time.  Neck: Jugular venous pressure is within normal limits. Carotids have normal upstrokes without bruits.   Cardiovascular: Regular rate and rhythm with 1-2/6 LAURA at RUSB.  Lungs: Clear without rales or wheezes. Equal expansion is noted.   Extremities: Show no edema.  Skin: warm and dry.  Neurologic: nonfocal    Diagnostic Data:    Lab Results   Component Value Date    CHOL 198 04/24/2018    TRIG 59 04/24/2018    HDL 58 04/24/2018    LDLDIRECT  06/22/2016      Comment:      Test not performed    AST 26 04/24/2018    ALT 35 04/24/2018     Lab Results   Component Value Date    GLUCOSE 80 04/24/2018    BUN 17 04/24/2018    CREATININE 0.70 04/24/2018    EGFRIFNONA 83 04/24/2018    BCR 24.3 04/24/2018    K 4.7 04/24/2018    CO2 26.0 04/24/2018    CALCIUM 10.1 04/24/2018    ALBUMIN 4.50 04/24/2018    LABIL2 1.7 04/24/2018    AST 26 04/24/2018    ALT 35 04/24/2018     Lab Results   Component Value Date    TSH 1.203 12/14/2017     Procedures    Assessment:      ICD-10-CM ICD-9-CM   1. Unstable angina I20.0 411.1   2. Shortness of breath R06.02 786.05   3. Essential hypertension I10 401.9   4. Mixed hyperlipidemia E78.2 272.2       Plan:    1. Cardiac catheterization to assess jaw pain, " shortness of breath, fatigue, and chest heaviness.It is likely that she will need to have a right femoral approach as her radial pulses are quite small.  2. Continue current medications.  3. F/up in 6 months or sooner if needed.    Scribed for Mandy Henry MD by Trav Burton. 5/24/2018  3:51 PM     I Mandy Henry MD personally performed the services described in this documentation as scribed by the above individual in my presence, and it is both accurate and complete.    Mandy Henry MD, FACC

## 2018-05-25 ENCOUNTER — PREP FOR SURGERY (OUTPATIENT)
Dept: OTHER | Facility: HOSPITAL | Age: 68
End: 2018-05-25

## 2018-05-25 ENCOUNTER — TRANSCRIBE ORDERS (OUTPATIENT)
Dept: ADMINISTRATIVE | Facility: HOSPITAL | Age: 68
End: 2018-05-25

## 2018-05-25 DIAGNOSIS — Z12.31 VISIT FOR SCREENING MAMMOGRAM: Primary | ICD-10-CM

## 2018-05-25 DIAGNOSIS — I20.0 UNSTABLE ANGINA (HCC): Primary | ICD-10-CM

## 2018-05-25 DIAGNOSIS — R73.03 PRE-DIABETES: ICD-10-CM

## 2018-05-25 RX ORDER — NITROGLYCERIN 0.4 MG/1
0.4 TABLET SUBLINGUAL
Status: CANCELLED | OUTPATIENT
Start: 2018-05-25

## 2018-05-25 RX ORDER — SODIUM CHLORIDE 0.9 % (FLUSH) 0.9 %
1-10 SYRINGE (ML) INJECTION AS NEEDED
Status: CANCELLED | OUTPATIENT
Start: 2018-05-25

## 2018-05-25 RX ORDER — ASPIRIN 325 MG
325 TABLET ORAL ONCE
Status: CANCELLED | OUTPATIENT
Start: 2018-05-25 | End: 2018-05-25

## 2018-05-25 RX ORDER — ASPIRIN 325 MG
325 TABLET, DELAYED RELEASE (ENTERIC COATED) ORAL DAILY
Status: CANCELLED | OUTPATIENT
Start: 2018-05-26

## 2018-05-25 RX ORDER — ONDANSETRON 2 MG/ML
4 INJECTION INTRAMUSCULAR; INTRAVENOUS EVERY 6 HOURS PRN
Status: CANCELLED | OUTPATIENT
Start: 2018-05-25

## 2018-05-25 RX ORDER — ACETAMINOPHEN 325 MG/1
650 TABLET ORAL EVERY 4 HOURS PRN
Status: CANCELLED | OUTPATIENT
Start: 2018-05-25

## 2018-06-03 ENCOUNTER — APPOINTMENT (OUTPATIENT)
Dept: PREADMISSION TESTING | Facility: HOSPITAL | Age: 68
End: 2018-06-03

## 2018-06-03 ENCOUNTER — HOSPITAL ENCOUNTER (OUTPATIENT)
Dept: GENERAL RADIOLOGY | Facility: HOSPITAL | Age: 68
Discharge: HOME | End: 2018-06-03
Admitting: NURSE PRACTITIONER

## 2018-06-03 DIAGNOSIS — I20.0 UNSTABLE ANGINA (HCC): ICD-10-CM

## 2018-06-03 DIAGNOSIS — R73.03 PRE-DIABETES: ICD-10-CM

## 2018-06-03 LAB
ALBUMIN SERPL-MCNC: 4.4 G/DL (ref 3.2–4.8)
ALBUMIN/GLOB SERPL: 1.8 G/DL (ref 1.5–2.5)
ALP SERPL-CCNC: 93 U/L (ref 25–100)
ALT SERPL W P-5'-P-CCNC: 25 U/L (ref 7–40)
ANION GAP SERPL CALCULATED.3IONS-SCNC: 8 MMOL/L (ref 3–11)
AST SERPL-CCNC: 23 U/L (ref 0–33)
BILIRUB SERPL-MCNC: 0.5 MG/DL (ref 0.3–1.2)
BUN BLD-MCNC: 21 MG/DL (ref 9–23)
BUN/CREAT SERPL: 30 (ref 7–25)
CALCIUM SPEC-SCNC: 9.8 MG/DL (ref 8.7–10.4)
CHLORIDE SERPL-SCNC: 106 MMOL/L (ref 99–109)
CO2 SERPL-SCNC: 28 MMOL/L (ref 20–31)
CREAT BLD-MCNC: 0.7 MG/DL (ref 0.6–1.3)
DEPRECATED RDW RBC AUTO: 41.7 FL (ref 37–54)
ERYTHROCYTE [DISTWIDTH] IN BLOOD BY AUTOMATED COUNT: 13.3 % (ref 11.3–14.5)
GFR SERPL CREATININE-BSD FRML MDRD: 83 ML/MIN/1.73
GLOBULIN UR ELPH-MCNC: 2.5 GM/DL
GLUCOSE BLD-MCNC: 92 MG/DL (ref 70–100)
HBA1C MFR BLD: 5.6 % (ref 4.8–5.6)
HCT VFR BLD AUTO: 40.3 % (ref 34.5–44)
HGB BLD-MCNC: 13.3 G/DL (ref 11.5–15.5)
MCH RBC QN AUTO: 28.2 PG (ref 27–31)
MCHC RBC AUTO-ENTMCNC: 33 G/DL (ref 32–36)
MCV RBC AUTO: 85.4 FL (ref 80–99)
PLATELET # BLD AUTO: 178 10*3/MM3 (ref 150–450)
PMV BLD AUTO: 11.8 FL (ref 6–12)
POTASSIUM BLD-SCNC: 3.6 MMOL/L (ref 3.5–5.5)
PROT SERPL-MCNC: 6.9 G/DL (ref 5.7–8.2)
RBC # BLD AUTO: 4.72 10*6/MM3 (ref 3.89–5.14)
SODIUM BLD-SCNC: 142 MMOL/L (ref 132–146)
WBC NRBC COR # BLD: 6.3 10*3/MM3 (ref 3.5–10.8)

## 2018-06-03 PROCEDURE — 85027 COMPLETE CBC AUTOMATED: CPT | Performed by: NURSE PRACTITIONER

## 2018-06-03 PROCEDURE — 83036 HEMOGLOBIN GLYCOSYLATED A1C: CPT | Performed by: NURSE PRACTITIONER

## 2018-06-03 PROCEDURE — 71046 X-RAY EXAM CHEST 2 VIEWS: CPT

## 2018-06-03 PROCEDURE — 80053 COMPREHEN METABOLIC PANEL: CPT | Performed by: NURSE PRACTITIONER

## 2018-06-03 PROCEDURE — 36415 COLL VENOUS BLD VENIPUNCTURE: CPT

## 2018-06-03 RX ORDER — ASPIRIN 81 MG/1
81 TABLET ORAL DAILY
COMMUNITY
End: 2018-11-08

## 2018-06-03 NOTE — DISCHARGE INSTRUCTIONS
"Dear Patient,    Drink plenty of fluids the day before to ensure you are well hydrated, unless otherwise directed by your physician.    Do NOT eat after midnight the night before your procedure.   You may have clear liquids only up to three hours before your scheduled arrival time (Water is best, but clear liquids can also include coffee without cream or milk, fruit juice without pulp, clear broth, and clear gelatin)    We encourage you to drink 8 ounces of water three to four hours before your scheduled arrival time.    Take your medications as instructed by your doctor.    Following your procedure, be sure to drink plenty of fluids to continue flushing the kidneys.   Benefits of hydrating before and after your procedure include:    -improved hydration helps prevent potential harm to the kidneys    by flushing the contrast/dye used during your procedure    -Lower post-procedure complications    -Improved patient comfort     Do NOT smoke after midnight the night before your procedure.    Glasses and jewelry may be worn, but dentures must be removed prior to your procedure.    Leave any items you consider valuable at home.      MORNING of your Procedure, please bring the following:     -Photo ID and insurance card(s)    -ALL medications in their ORIGINAL CONTAINERS    -Co-pay and/or deductible required by your insurance   -Copy of living will or power of  document (if not brought to    Pre-Admission Testing department)   -CPAP mask and tubing, not your machine (if applicable)    -Relaxation aids (music, books, magazines)    Family members may wait in [facility] waiting area during procedure.    Need to make arrangements for transportation prior to discharge.    A handout regarding \"Heart Healthy Eating\" was provided today to encourage healthy eating habits.    Booklet published by [employer] was given in Pre-Admission testing.  This booklet is for informational purposes only.  If you have any questions about your " procedure, please speak with your physician.

## 2018-06-04 ENCOUNTER — APPOINTMENT (OUTPATIENT)
Dept: CARDIOLOGY | Facility: HOSPITAL | Age: 68
End: 2018-06-04
Attending: INTERNAL MEDICINE

## 2018-06-04 ENCOUNTER — APPOINTMENT (OUTPATIENT)
Dept: CT IMAGING | Facility: HOSPITAL | Age: 68
End: 2018-06-04

## 2018-06-04 ENCOUNTER — HOSPITAL ENCOUNTER (OUTPATIENT)
Facility: HOSPITAL | Age: 68
Discharge: HOME | End: 2018-06-04
Attending: INTERNAL MEDICINE | Admitting: INTERNAL MEDICINE

## 2018-06-04 VITALS
BODY MASS INDEX: 21.19 KG/M2 | RESPIRATION RATE: 16 BRPM | SYSTOLIC BLOOD PRESSURE: 157 MMHG | HEIGHT: 61 IN | OXYGEN SATURATION: 100 % | TEMPERATURE: 98.9 F | HEART RATE: 48 BPM | DIASTOLIC BLOOD PRESSURE: 105 MMHG | WEIGHT: 112.21 LBS

## 2018-06-04 DIAGNOSIS — I20.0 UNSTABLE ANGINA (HCC): ICD-10-CM

## 2018-06-04 LAB
AORTIC DIMENSIONLESS INDEX: 0.3 (DI)
ARTICHOKE IGE QN: 123 MG/DL (ref 0–130)
BH CV ECHO MEAS - AO MAX PG (FULL): 17.7 MMHG
BH CV ECHO MEAS - AO MAX PG: 23 MMHG
BH CV ECHO MEAS - AO MEAN PG (FULL): 9.4 MMHG
BH CV ECHO MEAS - AO MEAN PG: 13 MMHG
BH CV ECHO MEAS - AO V2 MAX: 241 CM/SEC
BH CV ECHO MEAS - AO V2 MEAN: 148.6 CM/SEC
BH CV ECHO MEAS - AO V2 VTI: 60 CM
BH CV ECHO MEAS - ASC AORTA: 3.3 CM
BH CV ECHO MEAS - AVA(I,A): 1 CM^2
BH CV ECHO MEAS - AVA(I,D): 1.3 CM^2
BH CV ECHO MEAS - AVA(V,A): 1.1 CM^2
BH CV ECHO MEAS - AVA(V,D): 1.1 CM^2
BH CV ECHO MEAS - BSA(HAYCOCK): 1.5 M^2
BH CV ECHO MEAS - BSA: 1.5 M^2
BH CV ECHO MEAS - BZI_BMI: 21.2 KILOGRAMS/M^2
BH CV ECHO MEAS - BZI_METRIC_HEIGHT: 154.9 CM
BH CV ECHO MEAS - BZI_METRIC_WEIGHT: 50.8 KG
BH CV ECHO MEAS - EDV(CUBED): 111.2 ML
BH CV ECHO MEAS - EDV(TEICH): 108 ML
BH CV ECHO MEAS - EF(CUBED): 71.9 %
BH CV ECHO MEAS - EF(TEICH): 63.5 %
BH CV ECHO MEAS - ESV(CUBED): 31.2 ML
BH CV ECHO MEAS - ESV(TEICH): 39.4 ML
BH CV ECHO MEAS - FS: 34.5 %
BH CV ECHO MEAS - IVS/LVPW: 1.1
BH CV ECHO MEAS - IVSD: 0.96 CM
BH CV ECHO MEAS - LA DIMENSION: 2.9 CM
BH CV ECHO MEAS - LV MASS(C)D: 149 GRAMS
BH CV ECHO MEAS - LV MASS(C)DI: 100.9 GRAMS/M^2
BH CV ECHO MEAS - LV MAX PG: 2.3 MMHG
BH CV ECHO MEAS - LV MEAN PG: 1.3 MMHG
BH CV ECHO MEAS - LV V1 MAX: 75.5 CM/SEC
BH CV ECHO MEAS - LV V1 MEAN: 51.9 CM/SEC
BH CV ECHO MEAS - LV V1 VTI: 18 CM
BH CV ECHO MEAS - LVIDD: 4.8 CM
BH CV ECHO MEAS - LVIDS: 3.1 CM
BH CV ECHO MEAS - LVOT AREA (M): 3.1 CM^2
BH CV ECHO MEAS - LVOT AREA: 3.2 CM^2
BH CV ECHO MEAS - LVOT DIAM: 2 CM
BH CV ECHO MEAS - LVPWD: 0.85 CM
BH CV ECHO MEAS - MV A MAX VEL: 63.2 CM/SEC
BH CV ECHO MEAS - MV DEC TIME: 0.17 SEC
BH CV ECHO MEAS - MV E MAX VEL: 71.1 CM/SEC
BH CV ECHO MEAS - MV E/A: 1.1
BH CV ECHO MEAS - PA ACC SLOPE: 357.5 CM/SEC^2
BH CV ECHO MEAS - PA ACC TIME: 0.14 SEC
BH CV ECHO MEAS - PA MAX PG: 2 MMHG
BH CV ECHO MEAS - PA PR(ACCEL): 15.6 MMHG
BH CV ECHO MEAS - PA V2 MAX: 70.6 CM/SEC
BH CV ECHO MEAS - RAP SYSTOLE: 3 MMHG
BH CV ECHO MEAS - RVDD: 1.6 CM
BH CV ECHO MEAS - RVSP: 25 MMHG
BH CV ECHO MEAS - SI(CUBED): 54.2 ML/M^2
BH CV ECHO MEAS - SI(LVOT): 38.7 ML/M^2
BH CV ECHO MEAS - SI(TEICH): 46.5 ML/M^2
BH CV ECHO MEAS - SV(CUBED): 80 ML
BH CV ECHO MEAS - SV(LVOT): 57.2 ML
BH CV ECHO MEAS - SV(TEICH): 68.6 ML
BH CV ECHO MEAS - TAPSE (>1.6): 2.6 CM2
BH CV ECHO MEAS - TR MAX VEL: 215.5 CM/SEC
BH CV ECHO MEAS - TV MAX PG: 1.2 MMHG
BH CV ECHO MEAS - TV V2 MAX: 54.3 CM/SEC
BH CV XLRA - RV BASE: 3 CM
BH CV XLRA - RV LENGTH: 4.2 CM
BH CV XLRA - RV MID: 2.5 CM
BH CV XLRA - TDI S': 12.4 CM/SEC
CHOLEST SERPL-MCNC: 191 MG/DL (ref 0–200)
HDLC SERPL-MCNC: 56 MG/DL (ref 40–60)
LV EF 2D ECHO EST: 55 %
TRIGL SERPL-MCNC: 92 MG/DL (ref 0–150)

## 2018-06-04 PROCEDURE — C1894 INTRO/SHEATH, NON-LASER: HCPCS | Performed by: INTERNAL MEDICINE

## 2018-06-04 PROCEDURE — 93454 CORONARY ARTERY ANGIO S&I: CPT | Performed by: INTERNAL MEDICINE

## 2018-06-04 PROCEDURE — 36415 COLL VENOUS BLD VENIPUNCTURE: CPT

## 2018-06-04 PROCEDURE — 93306 TTE W/DOPPLER COMPLETE: CPT

## 2018-06-04 PROCEDURE — 0 IOPAMIDOL PER 1 ML: Performed by: INTERNAL MEDICINE

## 2018-06-04 PROCEDURE — 71275 CT ANGIOGRAPHY CHEST: CPT

## 2018-06-04 PROCEDURE — 25010000002 MIDAZOLAM PER 1 MG: Performed by: INTERNAL MEDICINE

## 2018-06-04 PROCEDURE — 80061 LIPID PANEL: CPT | Performed by: INTERNAL MEDICINE

## 2018-06-04 PROCEDURE — 93306 TTE W/DOPPLER COMPLETE: CPT | Performed by: INTERNAL MEDICINE

## 2018-06-04 PROCEDURE — 25010000002 FENTANYL CITRATE (PF) 100 MCG/2ML SOLUTION: Performed by: INTERNAL MEDICINE

## 2018-06-04 RX ORDER — NALOXONE HCL 0.4 MG/ML
0.4 VIAL (ML) INJECTION
Status: DISCONTINUED | OUTPATIENT
Start: 2018-06-04 | End: 2018-06-04 | Stop reason: HOSPADM

## 2018-06-04 RX ORDER — ASPIRIN 325 MG
325 TABLET ORAL ONCE
Status: COMPLETED | OUTPATIENT
Start: 2018-06-04 | End: 2018-06-04

## 2018-06-04 RX ORDER — ASPIRIN 325 MG
325 TABLET, DELAYED RELEASE (ENTERIC COATED) ORAL DAILY
Status: DISCONTINUED | OUTPATIENT
Start: 2018-06-05 | End: 2018-06-04 | Stop reason: HOSPADM

## 2018-06-04 RX ORDER — SODIUM CHLORIDE 0.9 % (FLUSH) 0.9 %
1-10 SYRINGE (ML) INJECTION AS NEEDED
Status: DISCONTINUED | OUTPATIENT
Start: 2018-06-04 | End: 2018-06-04 | Stop reason: HOSPADM

## 2018-06-04 RX ORDER — ACETAMINOPHEN 325 MG/1
650 TABLET ORAL EVERY 4 HOURS PRN
Status: DISCONTINUED | OUTPATIENT
Start: 2018-06-04 | End: 2018-06-04 | Stop reason: HOSPADM

## 2018-06-04 RX ORDER — SENNA AND DOCUSATE SODIUM 50; 8.6 MG/1; MG/1
2 TABLET, FILM COATED ORAL NIGHTLY
Status: DISCONTINUED | OUTPATIENT
Start: 2018-06-04 | End: 2018-06-04 | Stop reason: HOSPADM

## 2018-06-04 RX ORDER — MORPHINE SULFATE 2 MG/ML
1 INJECTION, SOLUTION INTRAMUSCULAR; INTRAVENOUS EVERY 4 HOURS PRN
Status: DISCONTINUED | OUTPATIENT
Start: 2018-06-04 | End: 2018-06-04 | Stop reason: HOSPADM

## 2018-06-04 RX ORDER — ALPRAZOLAM 0.25 MG/1
0.25 TABLET ORAL 3 TIMES DAILY PRN
Status: DISCONTINUED | OUTPATIENT
Start: 2018-06-04 | End: 2018-06-04 | Stop reason: HOSPADM

## 2018-06-04 RX ORDER — SODIUM CHLORIDE 9 MG/ML
100 INJECTION, SOLUTION INTRAVENOUS CONTINUOUS
Status: ACTIVE | OUTPATIENT
Start: 2018-06-04 | End: 2018-06-04

## 2018-06-04 RX ORDER — LIDOCAINE HYDROCHLORIDE 10 MG/ML
INJECTION, SOLUTION EPIDURAL; INFILTRATION; INTRACAUDAL; PERINEURAL AS NEEDED
Status: DISCONTINUED | OUTPATIENT
Start: 2018-06-04 | End: 2018-06-04 | Stop reason: HOSPADM

## 2018-06-04 RX ORDER — HYDROCODONE BITARTRATE AND ACETAMINOPHEN 7.5; 325 MG/1; MG/1
1 TABLET ORAL EVERY 4 HOURS PRN
Status: DISCONTINUED | OUTPATIENT
Start: 2018-06-04 | End: 2018-06-04 | Stop reason: HOSPADM

## 2018-06-04 RX ORDER — SODIUM CHLORIDE 9 MG/ML
250 INJECTION, SOLUTION INTRAVENOUS ONCE AS NEEDED
Status: DISCONTINUED | OUTPATIENT
Start: 2018-06-04 | End: 2018-06-04 | Stop reason: HOSPADM

## 2018-06-04 RX ORDER — FENTANYL CITRATE 50 UG/ML
INJECTION, SOLUTION INTRAMUSCULAR; INTRAVENOUS AS NEEDED
Status: DISCONTINUED | OUTPATIENT
Start: 2018-06-04 | End: 2018-06-04 | Stop reason: HOSPADM

## 2018-06-04 RX ORDER — SODIUM CHLORIDE 9 MG/ML
150 INJECTION, SOLUTION INTRAVENOUS CONTINUOUS
Status: DISCONTINUED | OUTPATIENT
Start: 2018-06-04 | End: 2018-06-04 | Stop reason: HOSPADM

## 2018-06-04 RX ORDER — ONDANSETRON 2 MG/ML
4 INJECTION INTRAMUSCULAR; INTRAVENOUS EVERY 6 HOURS PRN
Status: DISCONTINUED | OUTPATIENT
Start: 2018-06-04 | End: 2018-06-04 | Stop reason: HOSPADM

## 2018-06-04 RX ORDER — MIDAZOLAM HYDROCHLORIDE 1 MG/ML
INJECTION INTRAMUSCULAR; INTRAVENOUS AS NEEDED
Status: DISCONTINUED | OUTPATIENT
Start: 2018-06-04 | End: 2018-06-04 | Stop reason: HOSPADM

## 2018-06-04 RX ORDER — NITROGLYCERIN 0.4 MG/1
0.4 TABLET SUBLINGUAL
Status: DISCONTINUED | OUTPATIENT
Start: 2018-06-04 | End: 2018-06-04 | Stop reason: HOSPADM

## 2018-06-04 RX ADMIN — IOPAMIDOL 75 ML: 755 INJECTION, SOLUTION INTRAVENOUS at 13:55

## 2018-06-04 RX ADMIN — ASPIRIN 325 MG ORAL TABLET 325 MG: 325 PILL ORAL at 07:35

## 2018-06-04 RX ADMIN — SODIUM CHLORIDE 150 ML/HR: 9 INJECTION, SOLUTION INTRAVENOUS at 08:33

## 2018-06-04 NOTE — INTERVAL H&P NOTE
H&P reviewed. The patient was examined and there are no changes to the H&P.    Right radial access will be attempted, but groin will be prepped as well.  She does not have any upcoming surgeries in the near future.      Lab Results   Component Value Date    WBC 6.30 06/03/2018    HGB 13.3 06/03/2018    HCT 40.3 06/03/2018    MCV 85.4 06/03/2018     06/03/2018     Lab Results   Component Value Date    GLUCOSE 92 06/03/2018    BUN 21 06/03/2018    CREATININE 0.70 06/03/2018    EGFRIFNONA 83 06/03/2018    BCR 30.0 (H) 06/03/2018    K 3.6 06/03/2018    CO2 28.0 06/03/2018    CALCIUM 9.8 06/03/2018    ALBUMIN 4.40 06/03/2018    LABIL2 1.8 06/03/2018    AST 23 06/03/2018    ALT 25 06/03/2018     Lab Results   Component Value Date    HGBA1C 5.60 06/03/2018     Lipids pending    Mandy Henry M.D. [facility]

## 2018-06-04 NOTE — PLAN OF CARE
Problem: Patient Care Overview  Goal: Plan of Care Review  Outcome: Outcome(s) achieved Date Met: 06/04/18 06/04/18 1140   Coping/Psychosocial   Plan of Care Reviewed With patient   Plan of Care Review   Progress no change   OTHER   Outcome Summary Patient's site stable at time of discharge. The patient is being discharged home with family.      Goal: Individualization and Mutuality  Outcome: Outcome(s) achieved Date Met: 06/04/18    Goal: Discharge Needs Assessment  Outcome: Outcome(s) achieved Date Met: 06/04/18    Goal: Interprofessional Rounds/Family Conf  Outcome: Outcome(s) achieved Date Met: 06/04/18

## 2018-06-04 NOTE — H&P (VIEW-ONLY)
Nicki Kang  1950  [phone]      05/24/2018    Odilon Akhtar MD    Chief Complaint   Patient presents with   • Paroxysmal supraventricular tachycardia   • Hypertension       Problem List:  1. Paroxysmal atrial  tachycardia (PAT):  a. Diagnosed 1973.  b. Verapamil prescribed  to use p.r.n., but patient discontinued secondary to fatigue.  c. GXT, 05/15/1996, showing PVCs in rest and recovery with no sustained arrhythmias or ischemia.  d. Reported last 24-hour Holter monitor, 1998.  e. Reported stress echo 5 years ago, normal;  data deficit.  f. Event monitor, August 2008, showing several brief runs of atrial tachycardia.  2.  History of questionable bicuspid aortic valve.  a. 2-D echocardiogram,  April 1998, showing moderate thickening of the mitral valve without mitral valve prolapse with mild MR.  The aortic valve was probably tri-leaflet with no thickening with minimal aortic regurgitation and EF of 60%.  b. Echocardiogram, 08/11/2008:  Tri-leaflet  aortic valve with trace AI, mild-to-moderate MR, and mild-to-moderate TR.  LVEF of 55%.  c. Echocardiogram, 07/07/2014:  LVEF of 55% to 60% with mild MR and mild TR, and a trial leaflet appearing aortic valve.   d. Stress echo, 10/18/2017: EF 70%. Mild to moderate aortic valve stenosis. Mild TR. Mild MR. Good exercise tolerance with an expected duration of 6:10 and an actual duration of 7:24. No evidence of inducible ischemia.  3. Dyslipidemia:  Diet controlled.  4. Hypertension  a. Renal artery US, 6/16/2016: No significant renal arterial stenosis. Normal parenchymal blood flow patterns.  5. Pericarditis, 1972.  6. Obstructive sleep apnea.  7. Anxiety.  8. Migraine headaches.  9. Gastroesophageal reflux disease.  10.  Restless leg syndrome.  11. Insomnia.  12. Surgical history:  a. Breast  biopsy.  b. Benign lymph node removal.  c. Cholecystectomy.    Allergies   Allergen Reactions   • Fish Allergy Shortness Of Breath   • Lisinopril Palpitations      Throat swelling   • Losartan Shortness Of Breath   • Nuts Shortness Of Breath   • Soybean-Containing Drug Products Shortness Of Breath   • Piroxicam      Lethargy  and speech disturbance   • Isoflavones    • Chlorthalidone      Fatigue and GI distress   • Codeine      Malaise   • Gabapentin Mental Status Change   • Metoprolol      Migraines and fatigue   • Paroxetine Irritability     Panic attack   • Ranitidine GI Intolerance   • Topiramate      Malaise       Current Medications:      Current Outpatient Prescriptions:   •  acetaminophen (TYLENOL) 325 MG tablet, Take 2 tablets by mouth Daily As Needed., Disp: , Rfl:   •  aluminum-magnesium hydroxide-simethicone (MAALOX/MYLANTA) 200-200-20 MG/5ML suspension, Take 15 mL by mouth Daily As Needed., Disp: , Rfl:   •  Artificial Tear Ointment (EYE LUBRICANT OP), Administer 1 drop to both eyes Every Morning., Disp: , Rfl:   •  Cholecalciferol (VITAMIN D3) 5000 UNIT/ML liquid, Take 1 capsule by mouth Daily., Disp: , Rfl:   •  propranolol (INDERAL) 10 MG tablet, Take 1 tablet by mouth Daily As Needed (Prior to performance). (Patient taking differently: Take 10 mg by mouth As Needed (Prior to performance).), Disp: 30 tablet, Rfl: 0  •  verapamil SR (CALAN-SR) 120 MG CR tablet, TAKE ONE TABLET BY MOUTH ONCE NIGHTLY (Patient taking differently: TAKE ONE TABLET BY MOUTH ONCE MORNING), Disp: 90 tablet, Rfl: 1  •  vitamin B-12 (CYANOCOBALAMIN) 1000 MCG tablet, Take 1 tablet by mouth Daily., Disp: , Rfl:     HPI    Nicki Kang presents today for follow up of chest pain, shortness of breath, hypertension, and dyslipidemia. Since last visit, patient has noted that a few days ago, when she was going for her evening walk, after she got home she developed jaw pain. She states that the pain dissipated after approximately 15 minutes. Patient also notes that for a few years now, she has been struggling to get a deep breathe. She also experiences fatigue and chest heaviness. She  "remembers that she was having issues with shortness of breath when she underwent her stress echo last year. Patient denies edema, PND, orthopnea, dizziness, and syncope. She states that she has adjusted her diet in the past few months to help improve her cholesterol numbers.    The following portions of the patient's history were reviewed and updated as appropriate: allergies, current medications and problem list.    Pertinent positives as listed in the HPI.  All other systems reviewed are negative.    Vitals:    05/24/18 1457   BP: 126/78   BP Location: Right arm   Patient Position: Sitting   Pulse: 65   Weight: 50.1 kg (110 lb 6.4 oz)   Height: 154.9 cm (61\")       Physical Exam:  General: Alert and oriented to person, place, and time.  Neck: Jugular venous pressure is within normal limits. Carotids have normal upstrokes without bruits.   Cardiovascular: Regular rate and rhythm with 1-2/6 LAURA at RUSB.  Lungs: Clear without rales or wheezes. Equal expansion is noted.   Extremities: Show no edema.  Skin: warm and dry.  Neurologic: nonfocal    Diagnostic Data:    Lab Results   Component Value Date    CHOL 198 04/24/2018    TRIG 59 04/24/2018    HDL 58 04/24/2018    LDLDIRECT  06/22/2016      Comment:      Test not performed    AST 26 04/24/2018    ALT 35 04/24/2018     Lab Results   Component Value Date    GLUCOSE 80 04/24/2018    BUN 17 04/24/2018    CREATININE 0.70 04/24/2018    EGFRIFNONA 83 04/24/2018    BCR 24.3 04/24/2018    K 4.7 04/24/2018    CO2 26.0 04/24/2018    CALCIUM 10.1 04/24/2018    ALBUMIN 4.50 04/24/2018    LABIL2 1.7 04/24/2018    AST 26 04/24/2018    ALT 35 04/24/2018     Lab Results   Component Value Date    TSH 1.203 12/14/2017     Procedures    Assessment:      ICD-10-CM ICD-9-CM   1. Unstable angina I20.0 411.1   2. Shortness of breath R06.02 786.05   3. Essential hypertension I10 401.9   4. Mixed hyperlipidemia E78.2 272.2       Plan:    1. Cardiac catheterization to assess jaw pain, " shortness of breath, fatigue, and chest heaviness.It is likely that she will need to have a right femoral approach as her radial pulses are quite small.  2. Continue current medications.  3. F/up in 6 months or sooner if needed.    Scribed for Mandy Henry MD by Trav Burton. 5/24/2018  3:51 PM     I Mandy Henry MD personally performed the services described in this documentation as scribed by the above individual in my presence, and it is both accurate and complete.    Mandy Henry MD, FACC

## 2018-06-07 RX ORDER — DOXAZOSIN MESYLATE 1 MG/1
1 TABLET ORAL NIGHTLY
Qty: 30 TABLET | Refills: 11 | Status: SHIPPED | OUTPATIENT
Start: 2018-06-07 | End: 2018-08-30 | Stop reason: SINTOL

## 2018-06-11 ENCOUNTER — APPOINTMENT (OUTPATIENT)
Dept: MAMMOGRAPHY | Facility: HOSPITAL | Age: 68
End: 2018-06-11
Attending: OBSTETRICS & GYNECOLOGY

## 2018-08-15 ENCOUNTER — HOSPITAL ENCOUNTER (OUTPATIENT)
Dept: MAMMOGRAPHY | Facility: HOSPITAL | Age: 68
Discharge: HOME | End: 2018-08-15
Attending: OBSTETRICS & GYNECOLOGY | Admitting: OBSTETRICS & GYNECOLOGY

## 2018-08-15 DIAGNOSIS — Z12.31 VISIT FOR SCREENING MAMMOGRAM: ICD-10-CM

## 2018-08-15 PROCEDURE — 77067 SCR MAMMO BI INCL CAD: CPT | Performed by: RADIOLOGY

## 2018-08-15 PROCEDURE — 77063 BREAST TOMOSYNTHESIS BI: CPT

## 2018-08-15 PROCEDURE — 77067 SCR MAMMO BI INCL CAD: CPT

## 2018-08-15 PROCEDURE — 77063 BREAST TOMOSYNTHESIS BI: CPT | Performed by: RADIOLOGY

## 2018-08-17 ENCOUNTER — TELEPHONE (OUTPATIENT)
Dept: CARDIOLOGY | Facility: CLINIC | Age: 68
End: 2018-08-17

## 2018-08-17 NOTE — TELEPHONE ENCOUNTER
PT started on cardura 1 mg QHS on 6/7/2018- BP is running 130's systolic- occasional 120's- she states that since she started cardura she has 6 occular migraines- she has a history of occular migraines but usually 1 every 6-12 months- LHC was normal but she does have a 4.2 cm thoracic aneurysm.     Any changes? She isn't sure that cardura is causing the migraines, but she has noticed an increase in them since starting cardure-

## 2018-08-20 RX ORDER — AMLODIPINE BESYLATE 5 MG/1
5 TABLET ORAL DAILY
Qty: 90 TABLET | Refills: 1 | Status: SHIPPED | OUTPATIENT
Start: 2018-08-20 | End: 2018-08-30 | Stop reason: SINTOL

## 2018-08-20 NOTE — TELEPHONE ENCOUNTER
D/W [facility] who recommends stopping Cardura and starting Amlodipine 5 mg daily- msg left on VM - script sent to pharmacy

## 2018-08-30 NOTE — TELEPHONE ENCOUNTER
PT started Norvasc 2.5 mg daily ( actually she chose to cut the pill in half, so she is taking 1.25 mg daily)- she she is c/o a very bad headache, increased palpitations, cold flashes, weakness and shaking-

## 2018-08-30 NOTE — TELEPHONE ENCOUNTER
[facility] reviewed- PT had stopped Verapamil when she started Cardura- stop norvasc and restart Verapamil at 180 mg QHS- she will call me back if she any further problems-

## 2018-09-17 ENCOUNTER — TELEPHONE (OUTPATIENT)
Dept: CARDIOLOGY | Facility: CLINIC | Age: 68
End: 2018-09-17

## 2018-09-17 NOTE — TELEPHONE ENCOUNTER
PT started on Verapamil recently- she has been experiencing depression- she would like to try something else-   She has also tried cardura = occular migraine           norvasc =h/a,palpitaions, cold flashes

## 2018-09-18 RX ORDER — NEBIVOLOL 2.5 MG/1
2.5 TABLET ORAL DAILY
Qty: 30 TABLET | Refills: 5 | Status: SHIPPED | OUTPATIENT
Start: 2018-09-18 | End: 2018-11-08

## 2018-09-28 NOTE — TELEPHONE ENCOUNTER
PT started Bystolic 2.5 mg daily- she has not been able to tolerate anything that we have put her on- BP running 140-150/90's HR 45-50-        Verapamil=depression  Cardura=occular migraines  Amlodipine= headache, palps, cold flashes, weakness  Losartan=SOA  Chlorthalidone = fatigue and GI distress  Metoprolol= migraine

## 2018-10-02 RX ORDER — HYDRALAZINE HYDROCHLORIDE 10 MG/1
10 TABLET, FILM COATED ORAL EVERY 12 HOURS
Qty: 60 TABLET | Refills: 2 | Status: SHIPPED | OUTPATIENT
Start: 2018-10-02 | End: 2018-11-08 | Stop reason: SDUPTHER

## 2018-11-07 NOTE — PROGRESS NOTES
Nicki Kang  1950  68 y.o.  [phone]        11/08/2018    Peyton Yanez MD    Chief Complaint   Patient presents with   • Unstable angina       Problem List:  1. Paroxysmal atrial tachycardia (PAT):  a. Diagnosed 1973.  b. Verapamil prescribed  to use p.r.n., but patient discontinued secondary to fatigue.  c. GXT, 05/15/1996, showing PVCs in rest and recovery with no sustained arrhythmias or ischemia.  d. Reported last 24-hour Holter monitor, 1998.  e. Reported stress echo 5 years ago, normal;  data deficit.  f. Event monitor, August 2008, showing several brief runs of atrial tachycardia.  2.  Functionally bicuspid aortic valve:  a. 2-D echo, April 1998: Moderate thickening of the mitral valve without mitral valve prolapse with mild MR. The aortic valve was probably tri-leaflet with no thickening with minimal AI and EF of 60%.  b. Echo, 08/11/2008: EF 55%. Tri-leaflet aortic valve with trace AI, mild-to-moderate MR and TR  c. Echo, 07/07/2014: EF 55-60% with mild MR and TR, and a trial leaflet appearing aortic valve.   d. Stress echo, 10/18/2017: EF 70%. Mild-to-moderate AS. Mild TR. Mild MR. Good exercise tolerance with an expected duration of 6:10 and an actual duration of 7:24. No evidence of inducible ischemia.  e. [facility], 06/04/2018, for unstable angina: Normal coronary arteries. Possible dilation of the ascending aorta.  f. Echo, 06/04/2018: EF 55-60%. Mild AS, MR, and TR.  3. Dilated ascending aorta  a. CT angiogram of the chest June 4, 2018: Ascending thoracic aortic dimension of 4.2 cm.  4. Dyslipidemia: Diet controlled.  5. Hypertension:  a. Renal artery US, 6/16/2016: No significant renal arterial stenosis. Normal parenchymal blood flow patterns.  6. Pericarditis, 1972.  7. NOEL.  8. Anxiety.  9. Migraine headaches.  10. GERD.  11. Restless leg syndrome.  12. Insomnia.  13. Surgical history:  a. Breast  biopsy.  b. Benign lymph node removal.  c. Cholecystectomy.    Allergies   Allergen  Reactions   • Losartan Shortness Of Breath   • Nuts Shortness Of Breath   • Soybean-Containing Drug Products Shortness Of Breath   • Piroxicam      Lethargy  and speech disturbance   • Amlodipine Other (See Comments)     Fatigue, weakness, headache   • Cardura [Doxazosin Mesylate] Other (See Comments)     headache   • Isoflavones    • Verapamil Other (See Comments)     Did not control BP  Depression    • Chlorthalidone      Fatigue and GI distress   • Codeine      Malaise   • Gabapentin Mental Status Change   • Metoprolol      Migraines and fatigue   • Paroxetine Irritability     Panic attack   • Ranitidine GI Intolerance   • Topiramate      Malaise       Current Medications:      Current Outpatient Prescriptions:   •  acetaminophen (TYLENOL) 325 MG tablet, Take 2 tablets by mouth Daily As Needed., Disp: , Rfl:   •  aluminum-magnesium hydroxide-simethicone (MAALOX/MYLANTA) 200-200-20 MG/5ML suspension, Take 15 mL by mouth Daily As Needed., Disp: , Rfl:   •  Artificial Tear Ointment (EYE LUBRICANT OP), Administer 1 drop to both eyes Every Morning., Disp: , Rfl:   •  Cholecalciferol (VITAMIN D3) 5000 UNIT/ML liquid, Take 1 capsule by mouth Daily., Disp: , Rfl:   •  hydrALAZINE (APRESOLINE) 10 MG tablet, Take 1 tablet by mouth Every 12 (Twelve) Hours., Disp: 60 tablet, Rfl: 2  •  Menaquinone-7 (VITAMIN K2 PO), Take  by mouth Daily., Disp: , Rfl:   •  Pediatric Multiple Vitamins (CHILDRENS MULTI-VITAMINS PO), Take  by mouth Daily., Disp: , Rfl:   •  vitamin B-12 (CYANOCOBALAMIN) 1000 MCG tablet, Take 1 tablet by mouth Daily., Disp: , Rfl:     HPI    Nicki Kang is a 68 y.o. female who presents today for 5 month hospital follow up s/p left heart catheterization. She has a bicuspid aortic valve, hypertension, dyslipidemia, and history of paroxysmal atrial tachycardia. Since last visit, she has been doing well overall from a cardiovascular standpoint. She monitors her BP at home with readings around 135-148/90. She  "reports she monitors her sodium intake, and stays physically active with walking frequently and doing yoga twice a week. Patient denies chest pain, palpitations, shortness of breath, edema, dizziness, and syncope.    The following portions of the patient's history were reviewed and updated as appropriate: allergies, current medications and problem list.    Pertinent positives as listed in the HPI.  All other systems reviewed are negative.    Vitals:    11/08/18 0913   BP: 138/94   BP Location: Right arm   Patient Position: Sitting   Pulse: 72   Weight: 51.7 kg (114 lb)   Height: 154.9 cm (61\")       Physical Exam:    General: Alert and oriented  Neck: Jugular venous pressure is within normal limits. Carotids have normal upstrokes without bruits.   Cardiovascular: Heart has a nondisplaced focal PMI. Regular rate and rhythm without murmur, gallop or rub.  Lungs: Clear without rales or wheezes. Equal expansion is noted.   Extremities: Show no edema.  Skin: warm and dry.  Neurologic: nonfocal    Diagnostic Data:  Last labs at time of hospitalization:  Lab Results   Component Value Date    GLUCOSE 92 06/03/2018    BUN 21 06/03/2018    CREATININE 0.70 06/03/2018    EGFRIFNONA 83 06/03/2018    BCR 30.0 (H) 06/03/2018    K 3.6 06/03/2018    CO2 28.0 06/03/2018    CALCIUM 9.8 06/03/2018    ALBUMIN 4.40 06/03/2018    AST 23 06/03/2018    ALT 25 06/03/2018     Lab Results   Component Value Date    CHOL 191 06/04/2018    CHLPL 245 (H) 11/18/2015    TRIG 92 06/04/2018    HDL 56 06/04/2018     06/04/2018    LDLDIRECT  06/22/2016      Comment:      Test not performed     Lab Results   Component Value Date    WBC 6.30 06/03/2018    HGB 13.3 06/03/2018    HCT 40.3 06/03/2018    MCV 85.4 06/03/2018     06/03/2018       Procedures    Assessment:      ICD-10-CM ICD-9-CM   1. Essential hypertension I10 401.9   2. Bicuspid aortic valve Q23.1 746.4   3. Mixed hyperlipidemia E78.2 272.2   4. Aortic root dilation (CMS/HCC) " I77.810 447.71     Patient was counseled on her bicuspid aortic valve, and treatment options. She also asked whether she could take propanolol for her performance anxiety, given her other medications, and was told she could take propanolol prn.    Plan:    1. Increase Hydralazine 10 mg BID to 20 mg BID for better management of hypertension.  2. Continue current diet and exercise regimen for management of dyslipidemia.  3. Continue current medications.  4. F/up in 4 months or sooner if needed.    Scribed for Mandy Henry MD by Shana Woodward. 11/8/2018  9:45 AM     I Mandy Henry MD personally performed the services described in this documentation as scribed by the above individual in my presence, and it is both accurate and complete.    Mandy Henry MD, FACC

## 2018-11-08 ENCOUNTER — OFFICE VISIT (OUTPATIENT)
Dept: CARDIOLOGY | Facility: CLINIC | Age: 68
End: 2018-11-08

## 2018-11-08 VITALS
SYSTOLIC BLOOD PRESSURE: 138 MMHG | BODY MASS INDEX: 21.52 KG/M2 | HEART RATE: 72 BPM | HEIGHT: 61 IN | DIASTOLIC BLOOD PRESSURE: 94 MMHG | WEIGHT: 114 LBS

## 2018-11-08 DIAGNOSIS — Q23.1 BICUSPID AORTIC VALVE: ICD-10-CM

## 2018-11-08 DIAGNOSIS — I77.810 AORTIC ROOT DILATION (HCC): ICD-10-CM

## 2018-11-08 DIAGNOSIS — I10 ESSENTIAL HYPERTENSION: Primary | ICD-10-CM

## 2018-11-08 DIAGNOSIS — E78.2 MIXED HYPERLIPIDEMIA: ICD-10-CM

## 2018-11-08 PROCEDURE — 99214 OFFICE O/P EST MOD 30 MIN: CPT | Performed by: INTERNAL MEDICINE

## 2018-11-08 RX ORDER — HYDRALAZINE HYDROCHLORIDE 10 MG/1
TABLET, FILM COATED ORAL
Qty: 120 TABLET | Refills: 3 | Status: SHIPPED | OUTPATIENT
Start: 2018-11-08 | End: 2018-12-06 | Stop reason: SDUPTHER

## 2018-12-06 ENCOUNTER — TELEPHONE (OUTPATIENT)
Dept: CARDIOLOGY | Facility: CLINIC | Age: 68
End: 2018-12-06

## 2018-12-06 RX ORDER — HYDRALAZINE HYDROCHLORIDE 10 MG/1
TABLET, FILM COATED ORAL
Qty: 180 TABLET | Refills: 3 | Status: SHIPPED | OUTPATIENT
Start: 2018-12-06 | End: 2019-01-14 | Stop reason: SDUPTHER

## 2018-12-06 NOTE — TELEPHONE ENCOUNTER
Hydralazine 20 mg BID is still not controlling BP- running 140-150/ - Reviewed by [facility], who recommends increasing 30 mg BID and to call me in 2 weeks with an update- we will increase as needed

## 2019-01-14 RX ORDER — HYDRALAZINE HYDROCHLORIDE 10 MG/1
TABLET, FILM COATED ORAL
Qty: 240 TABLET | Refills: 6 | Status: SHIPPED | OUTPATIENT
Start: 2019-01-14 | End: 2019-10-30

## 2019-01-14 NOTE — TELEPHONE ENCOUNTER
Will increase hydralazine to 40 mg bid- we wanted to increase to 50 mg bid but pt was reluctant and wants to do it slowly-

## 2019-01-28 ENCOUNTER — TELEPHONE (OUTPATIENT)
Dept: CARDIOLOGY | Facility: CLINIC | Age: 69
End: 2019-01-28

## 2019-01-28 NOTE — TELEPHONE ENCOUNTER
"PT has been slowly increasing Hydralazine and is currently up to \"33mg every 12 hours\" - her bp at 33 mg still 130's systolic - c/o heart pounding at 34 mg but not so much at 33 mg-   also c/o stuffy nose- she is requesting another medication- PCP Peyton Yanez recently started her on propranolol 10 mg as needed for performance anxiety- she is wondering if this may be an option for BP control - HR runs 70's- 's/70's   "

## 2019-01-29 RX ORDER — PROPRANOLOL HYDROCHLORIDE 10 MG/1
TABLET ORAL
Qty: 120 TABLET | Refills: 3 | Status: SHIPPED | OUTPATIENT
Start: 2019-01-29 | End: 2019-10-30

## 2019-01-29 NOTE — TELEPHONE ENCOUNTER
Reviewed by PW - ok to use Propranolol 20 mg q 12 hours- she will update in 1-2 weeks or sooner if needed

## 2019-02-06 ENCOUNTER — TRANSCRIBE ORDERS (OUTPATIENT)
Dept: ADMINISTRATIVE | Facility: HOSPITAL | Age: 69
End: 2019-02-06

## 2019-02-06 DIAGNOSIS — N63.13 BREAST LUMP ON RIGHT SIDE AT 8 O'CLOCK POSITION: ICD-10-CM

## 2019-02-06 DIAGNOSIS — Z12.31 VISIT FOR SCREENING MAMMOGRAM: Primary | ICD-10-CM

## 2019-02-12 ENCOUNTER — OFFICE VISIT (OUTPATIENT)
Dept: NEUROLOGY | Facility: CLINIC | Age: 69
End: 2019-02-12

## 2019-02-12 VITALS
DIASTOLIC BLOOD PRESSURE: 96 MMHG | RESPIRATION RATE: 16 BRPM | OXYGEN SATURATION: 99 % | BODY MASS INDEX: 21.71 KG/M2 | HEIGHT: 61 IN | HEART RATE: 51 BPM | SYSTOLIC BLOOD PRESSURE: 142 MMHG | WEIGHT: 115 LBS

## 2019-02-12 DIAGNOSIS — G25.81 RESTLESS LEGS SYNDROME (RLS): Primary | ICD-10-CM

## 2019-02-12 PROCEDURE — 99213 OFFICE O/P EST LOW 20 MIN: CPT | Performed by: PSYCHIATRY & NEUROLOGY

## 2019-02-12 NOTE — PROGRESS NOTES
Subjective:   Chief Complaint   Patient presents with   • Restless Legs Syndrome      Patient ID: Nicki Kang is a 68 y.o. female.    History of Present Illness     68 y.o.  woman returns in follow up for a new problem of RLS.        RLS    RLS sx started as a child.  Develops sensation of needing to move during the day and lying down at night.  Sx come on with sitting over a few minutes to 30 minutes.  Moderate to severe intensity.      Up walking at night.      Weaned off Ativan 4 years ago.  Trial of Mirapex made sx worse.      Gradual onset of bilateral leg pain over several years.  Quality of bugs crawling on legs.  Using lorazepam for insomnia.        Problem history:    2/13/16 remembers lying down in bed then is downstairs sitting in a chair.  Next she is getting out of car to go to hospital.  Then about 3 hours later awakening on gurney in ED.  Mild HA after event.       Headaches are every other day.  Starts in front and goes to back or in neck to face.  Dull aching discomfort of mild to moderate intensity lasting for 48 hours.  Treating with Tylenol can control sx.  Sensitive to light.  Visual aura.      Discussed with Dr Akhtar for work in to clinic schedule and recommended EEG.     Reviewed Dr Akhtar's and [facility] ED notes :    2/13/16 around 11:50 pm developed acute onset of memory loss.  Sexual intercourse one hour prior to sx.  She was available to recognize people but disorientated for date, time and location.  Sx present for 3 hours.  Presented to [facility] for eval.  Labs reviewed of CBC, CMP. U/A unremarkable.  HCT - normal. MRI Brain - small vessel disease. Sx resolved on discharge.  Previous episode of TGA in 2005.       The following portions of the patient's history were reviewed and updated as appropriate: allergies, current medications, past family history, past medical history, past social history, past surgical history and problem list.    Review of Systems   Constitutional: Positive for  "fatigue. Negative for activity change and unexpected weight change.   HENT: Negative for tinnitus and trouble swallowing.    Eyes: Negative for photophobia and visual disturbance.   Respiratory: Negative for apnea and choking.    Cardiovascular: Negative for leg swelling.   Endocrine: Negative for cold intolerance and heat intolerance.   Genitourinary: Negative for difficulty urinating, frequency, menstrual problem and urgency.   Musculoskeletal: Negative for back pain and gait problem.   Skin: Negative for color change.   Allergic/Immunologic: Negative for immunocompromised state.   Neurological: Negative for dizziness, tremors, seizures, syncope, facial asymmetry, speech difficulty and light-headedness.   Hematological: Negative for adenopathy. Does not bruise/bleed easily.   Psychiatric/Behavioral: Positive for sleep disturbance. Negative for behavioral problems, decreased concentration and hallucinations.        Objective:  Vitals:    02/12/19 1437   BP: 142/96   BP Location: Left arm   Patient Position: Sitting   Cuff Size: Adult   Pulse: 51   Resp: 16   SpO2: 99%   Weight: 52.2 kg (115 lb)   Height: 154.9 cm (61\")       Neurologic Exam     Mental Status   Registration: recalls 3 of 3 objects. Recall at 5 minutes: recalls 3 of 3 objects. Follows 3 step commands.   Attention: normal. Concentration: normal.   Level of consciousness: alert  Knowledge: good and consistent with education.   Able to name object. Able to read. Able to repeat. Able to write. Normal comprehension.     Cranial Nerves     CN II   Visual fields full to confrontation.   Visual acuity: normal  Right visual field deficit: none  Left visual field deficit: none     CN III, IV, VI   Right pupil: Shape: regular. Reactivity: brisk. Consensual response: intact.   Left pupil: Shape: regular. Reactivity: brisk. Consensual response: intact.   Nystagmus: none   Diplopia: none  Ophthalmoparesis: none  Upgaze: normal  Downgaze: normal  Conjugate gaze: " present  Vestibulo-ocular reflex: present    CN V   Facial sensation intact.   Right corneal reflex: normal  Left corneal reflex: normal    CN VII   Right facial weakness: none  Left facial weakness: none    CN VIII   Hearing: intact    CN IX, X   Palate: symmetric  Right gag reflex: normal  Left gag reflex: normal    CN XI   Right sternocleidomastoid strength: normal  Left sternocleidomastoid strength: normal    CN XII   Tongue: not atrophic  Fasciculations: absent  Tongue deviation: none    Motor Exam   Muscle bulk: normal  Overall muscle tone: normal  Right arm tone: normal  Left arm tone: normal  Right leg tone: normal  Left leg tone: normal    Sensory Exam   Light touch normal.   Vibration normal.   Proprioception normal.   Pinprick normal.     Gait, Coordination, and Reflexes     Tremor   Resting tremor: absent  Intention tremor: absent  Action tremor: absent    Reflexes   Reflexes 2+ except as noted.       Physical Exam   Constitutional: She appears well-developed and well-nourished.   Nursing note and vitals reviewed.      Assessment/Plan:       Problems Addressed this Visit        Other    Restless legs syndrome (RLS) - Primary     Given samples of Neupro 1 - 2 mg patches

## 2019-03-04 ENCOUNTER — APPOINTMENT (OUTPATIENT)
Dept: MAMMOGRAPHY | Facility: HOSPITAL | Age: 69
End: 2019-03-04

## 2019-03-28 ENCOUNTER — OFFICE VISIT (OUTPATIENT)
Dept: CARDIOLOGY | Facility: CLINIC | Age: 69
End: 2019-03-28

## 2019-03-28 VITALS
SYSTOLIC BLOOD PRESSURE: 132 MMHG | WEIGHT: 117 LBS | HEIGHT: 61 IN | DIASTOLIC BLOOD PRESSURE: 82 MMHG | HEART RATE: 52 BPM | BODY MASS INDEX: 22.09 KG/M2

## 2019-03-28 DIAGNOSIS — E78.2 MIXED HYPERLIPIDEMIA: ICD-10-CM

## 2019-03-28 DIAGNOSIS — I10 ESSENTIAL HYPERTENSION: ICD-10-CM

## 2019-03-28 DIAGNOSIS — I77.810 AORTIC ROOT DILATION (HCC): Primary | ICD-10-CM

## 2019-03-28 DIAGNOSIS — Q23.1 BICUSPID AORTIC VALVE: Primary | ICD-10-CM

## 2019-03-28 DIAGNOSIS — I77.810 AORTIC ROOT DILATION (HCC): ICD-10-CM

## 2019-03-28 PROCEDURE — 99214 OFFICE O/P EST MOD 30 MIN: CPT | Performed by: INTERNAL MEDICINE

## 2019-03-28 NOTE — PROGRESS NOTES
Nicki Kang  1950  69 y.o.  [phone]  [phone]      Date: 03/28/2019    PCP: Peyton Yanez MD    Chief Complaint   Patient presents with   • Hypertension       Problem List:  1. Paroxysmal atrial tachycardia (PAT):  a. Diagnosed 1973.  b. Verapamil prescribed to use p.r.n., but patient discontinued secondary to fatigue.  c. GXT, 05/15/1996, showing PVCs in rest and recovery with no sustained arrhythmias or ischemia.  d. Reported last 24-hour Holter monitor, 1998.  e. Reported stress echo 5 years ago, normal;  data deficit.  f. Event monitor, August 2008, showing several brief runs of atrial tachycardia.  2.  Functionally bicuspid aortic valve:  a. Echo, April 1998: Moderate thickening of the mitral valve without mitral valve prolapse, with mild MR. The aortic valve was probably tri-leaflet with no thickening, minimal AI and EF of 60%.  b. Echo, 08/11/2008: EF 55%. Tri-leaflet aortic valve with trace AI, mild-to-moderate MR/TR  c. Echo, 07/07/2014: EF 55-60% with mild MR/TR, and a trial leaflet appearing aortic valve.   d. Stress echo, 10/18/2017: EF 70%. Mild-to-moderate AS. Mild TR/MR. Good exercise tolerance with an expected duration of 6:10 and an actual duration of 7:24. No evidence of inducible ischemia.  e. [facility], 06/04/2018, for unstable angina: Normal coronary arteries. Possible dilation of the ascending aorta.  f. Echo, 06/04/2018: EF 55-60%. Mild AS/MR/TR.  3. Dilated ascending aorta  a. CTA of the chest, 06/04/2018: Ascending thoracic aortic dimension of 4.2 cm.  4. Dyslipidemia:   a. Diet controlled.  5. Hypertension:  a. Renal artery US, 6/16/2016: No significant renal arterial stenosis. Normal parenchymal blood flow patterns.  6. Pericarditis, 1972.  7. NOEL.  8. Anxiety.  9. Migraine headaches.  10. GERD.  11. Restless leg syndrome.  12. Insomnia.  13. Surgical history:  a. Breast  biopsy.  b. Benign lymph node removal.  c. Cholecystectomy.    Allergies   Allergen Reactions   •  Losartan Shortness Of Breath   • Nuts Shortness Of Breath   • Soybean-Containing Drug Products Shortness Of Breath   • Piroxicam      Lethargy  and speech disturbance   • Amlodipine Other (See Comments)     Fatigue, weakness, headache   • Cardura [Doxazosin Mesylate] Other (See Comments)     headache   • Isoflavones    • Verapamil Other (See Comments)     Did not control BP  Depression    • Chlorthalidone      Fatigue and GI distress   • Codeine      Malaise   • Gabapentin Mental Status Change   • Metoprolol      Migraines and fatigue   • Paroxetine Irritability     Panic attack   • Ranitidine GI Intolerance   • Topiramate      Malaise       Current Medications:      Current Outpatient Medications:   •  hydrALAZINE (APRESOLINE) 10 MG tablet, 4 tabs po every 12 hours (Patient taking differently: Take 25 mg by mouth 2 (Two) Times a Day.), Disp: 240 tablet, Rfl: 6  •  propranolol (INDERAL) 10 MG tablet, 2 tabs po every 12 hours (Patient taking differently: Take 5 mg by mouth 2 (Two) Times a Day. 2 tabs po every 12 hours), Disp: 120 tablet, Rfl: 3    HPI    Nicki Kang is a 69 y.o. female who presents today for 4 month follow up of paroxysmal atrial tachycardia, bicuspid aortic valve, hypertension, and hyperlipidemia. Since last visit, she has been doing well overall from a cardiovascular standpoint. She feels that her blood pressure has been better controlled since she increased her hydralazine to 25 mg BID. However, she has now had a few occular migraines. Patient denies chest pain, palpitations, shortness of breath, edema, dizziness, and syncope. She states that she does not sleep well due to RLS, and wonders whether this might contribute to increasing her blood pressure.    The following portions of the patient's history were reviewed and updated as appropriate: allergies, current medications and problem list.    Pertinent positives as listed in the HPI.  All other systems reviewed are negative.    Vitals:  "   03/28/19 1519   BP: 132/82   BP Location: Right arm   Patient Position: Sitting   Pulse: 52   Weight: 53.1 kg (117 lb)   Height: 154.9 cm (61\")       Physical Exam:    General: Alert and oriented.  Neck: Jugular venous pressure is within normal limits. Carotids have normal upstrokes without bruits.   Cardiovascular: Heart has a nondisplaced focal PMI. Regular rate and rhythm without murmur, gallop or rub.  Lungs: Clear without rales or wheezes. Equal expansion is noted.   Extremities: Show no edema.  Skin: Warm and dry.  Neurologic: Nonfocal.    Diagnostic Data:  Lab Results   Component Value Date    GLUCOSE 92 06/03/2018    BUN 21 06/03/2018    CREATININE 0.70 06/03/2018    EGFRIFNONA 83 06/03/2018    BCR 30.0 (H) 06/03/2018     06/03/2018    K 3.6 06/03/2018     06/03/2018    CO2 28.0 06/03/2018    CALCIUM 9.8 06/03/2018    ALBUMIN 4.40 06/03/2018    AST 23 06/03/2018    ALT 25 06/03/2018     Lab Results   Component Value Date    CHOL 191 06/04/2018    TRIG 92 06/04/2018    HDL 56 06/04/2018     06/04/2018    LDLDIRECT  06/22/2016      Comment:      Test not performed        Procedures    Assessment:      ICD-10-CM ICD-9-CM   1. Bicuspid aortic valve Q23.1 746.4   2. Aortic root dilation (CMS/HCC) I77.810 447.71   3. Essential hypertension I10 401.9   4. Mixed hyperlipidemia E78.2 272.2     Lab results found above were reviewed with the patient.    Plan:    1. Repeat CT Angiogram of chest in July for follow-up of aortic valve and aortic root.  2. Repeat echocardiogram in June or July 2020  3. Continue hydralazine 25 mg BID and propanolol 5 mg BID for control of blood pressure. Continue monitoring blood pressure at home, with goal < 120/80 mmHg. Decrease sodium intake.  4. Continue management of dyslipidemia with diet and exercise.  5. F/up in 6 months or sooner if needed.    Scribed for Mandy Henry MD by Shana Woodward. 3/28/2019  3:33 PM     I Mandy Henry MD personally " performed the services described in this documentation as scribed by the above individual in my presence, and it is both accurate and complete.    Mandy Henry MD, FACC

## 2019-06-18 ENCOUNTER — TELEPHONE (OUTPATIENT)
Dept: CARDIOLOGY | Facility: CLINIC | Age: 69
End: 2019-06-18

## 2019-06-18 NOTE — TELEPHONE ENCOUNTER
Pt calls to report that she thinks Propranolol is causing depression- she would like to stop it-     Instructed to wean off over the next 2 weeks and see how her symptoms are- if she is better off, we will address BP at that time- if no better, she will need to see PCP-    PT verbalized understanding-

## 2019-07-08 ENCOUNTER — OFFICE VISIT (OUTPATIENT)
Dept: ORTHOPEDIC SURGERY | Facility: CLINIC | Age: 69
End: 2019-07-08

## 2019-07-08 VITALS — BODY MASS INDEX: 19.56 KG/M2 | WEIGHT: 103.62 LBS | HEIGHT: 61 IN | OXYGEN SATURATION: 98 % | HEART RATE: 59 BPM

## 2019-07-08 DIAGNOSIS — M79.671 RIGHT FOOT PAIN: Primary | ICD-10-CM

## 2019-07-08 DIAGNOSIS — M20.21 ACQUIRED HALLUX RIGIDUS OF RIGHT FOOT: ICD-10-CM

## 2019-07-08 PROCEDURE — 99203 OFFICE O/P NEW LOW 30 MIN: CPT | Performed by: ORTHOPAEDIC SURGERY

## 2019-07-08 NOTE — PROGRESS NOTES
NEW PATIENT    Patient: Nicki Kang  : 1950    Primary Care Provider: Peyton Yanez MD    Requesting Provider: As above    Pain of the Right Foot (Outside if the little toe - 2X months )      History    Chief Complaint: Right foot pain    History of Present Illness: This is an extremely pleasant 69-year-old woman who I last saw in  for hallux rigidus.  She has done fairly well with orthotics and shoe modifications.  She is here with a new problem.  In May of this year she began to have pain over the fifth metatarsal head.  She increased her walking program about that time, but only increased it from 1-1/2 miles to 2 miles.  No change in her shoes, no injuries.  She reports that it seems to hurt barefoot and in shoes, but perhaps a little bit more barefoot.  She has noted some faint redness over the fifth metatarsal head.  Her son is a PA in an orthopedic group in [place], and he put her in a short boot.  She reports no pain in the boot.  There are also some sandals she can wear with no pain.  In a closed shoe the pain can be 3 out of 10, aching and sharp.    Current Outpatient Medications on File Prior to Visit   Medication Sig Dispense Refill   • hydrALAZINE (APRESOLINE) 10 MG tablet 4 tabs po every 12 hours (Patient taking differently: Take 25 mg by mouth 2 (Two) Times a Day.) 240 tablet 6   • propranolol (INDERAL) 10 MG tablet 2 tabs po every 12 hours (Patient taking differently: Take 5 mg by mouth 2 (Two) Times a Day. 2 tabs po every 12 hours) 120 tablet 3     No current facility-administered medications on file prior to visit.       Allergies   Allergen Reactions   • Losartan Shortness Of Breath   • Nuts Shortness Of Breath   • Soybean-Containing Drug Products Shortness Of Breath   • Piroxicam      Lethargy  and speech disturbance   • Amlodipine Other (See Comments)     Fatigue, weakness, headache   • Cardura [Doxazosin Mesylate] Other (See Comments)     headache   • Isoflavones    •  "Verapamil Other (See Comments)     Did not control BP  Depression    • Chlorthalidone      Fatigue and GI distress   • Codeine      Malaise   • Gabapentin Mental Status Change   • Metoprolol      Migraines and fatigue   • Paroxetine Irritability     Panic attack   • Ranitidine GI Intolerance   • Topiramate      Malaise      Past Medical History:   Diagnosis Date   • Anemia 1956    type unknown   • Anesthesia complication     patient states after her gallbladder she was hard to wake up and the anesthesiologist told her she was a \"light weight\"   • Arrhythmia 1978?    PAT   • Arthritis    • BCC (basal cell carcinoma of skin) Remote   • Bicuspid aortic valve 11/8/2018   • Cervicalgia 2009    Moderately persistent   • Chronic anxiety    • Chronic gastritis 2016   • Dizziness    • GERD (gastroesophageal reflux disease)    • Heart murmur    • HLD (hyperlipidemia)    • HTN (hypertension) 2013   • Infectious mononucleosis 1972    Transient pericarditis   • Irritable bowel syndrome 2016    Improved with FODMAP guidelines   • Migraine    • Migraines Lifelong    marked light sensitivity   • Mononucleosis 1972    with associated percarditis   • Paroxysmal atrial tachycardia (CMS/HCC)    • Peptic ulcer 1963   • Pericarditis     with mono   • RLS (restless legs syndrome) 2010   • Sleep apnea 2015    Failed CPAP   • SVT (supraventricular tachycardia) (CMS/HCC) Adulthood    With PAT   • Transient global amnesia 2005, 2017    with negative neurology workup   • Transient global amnesia    • Valvular disease 1980?    Bicuspid aortic valve   • Vitamin B deficiency    • Vitamin D deficiency      Past Surgical History:   Procedure Laterality Date   • BASAL CELL CARCINOMA EXCISION  remote    arms   • BREAST BIOPSY Right     MRI   • BREAST EXCISIONAL BIOPSY Bilateral 1975   • BREAST SURGERY  1970    biopsy done - benign   • CARDIAC CATHETERIZATION N/A 6/4/2018    Procedure: Left Heart Cath;  Surgeon: Mandy Henry MD;  Location: "  [facility];  Service: Cardiovascular   • CHOLECYSTECTOMY     • COLONOSCOPY     • ENDOSCOPY     • LASIK      corneal LASIK - persistent dry     Family History   Problem Relation Age of Onset   • Heart failure Mother          age 77   • Arrhythmia Mother    • Diabetes Father    • Stroke Father          age 78   • Arthritis Maternal Grandmother    • Alzheimer's disease Paternal Grandmother    • Dementia Paternal Grandmother    • No Known Problems Sister    • BRCA 1/2 Neg Hx    • Breast cancer Neg Hx    • Ovarian cancer Neg Hx       Social History     Socioeconomic History   • Marital status:      Spouse name: Not on file   • Number of children: Not on file   • Years of education: Not on file   • Highest education level: Not on file   Occupational History   • Occupation: Retired     Comment:    Tobacco Use   • Smoking status: Never Smoker   • Smokeless tobacco: Never Used   Substance and Sexual Activity   • Alcohol use: No   • Drug use: No   • Sexual activity: Yes     Partners: Male     Birth control/protection: Post-menopausal   Social History Narrative    Domestic life:   Lives in private home with         [employer]:   [employer]        Sleep hygiene:   6 hours/night        Caffeine use:   None        Exercise habits:   Stationary cycle and PT exercises 30 minutes 6 days weekly - walks several days weekly        Diet:     Well-balanced, no almonds, soy or fish        Occupation:   Retired         Hearing    No impairment        Vision     corrects with reading glasses        Driving     no limitations         Review of Systems   Constitutional: Positive for activity change.   HENT: Negative.    Eyes: Positive for visual disturbance.   Respiratory: Negative.    Cardiovascular: Positive for palpitations.   Gastrointestinal: Negative.    Endocrine: Negative.    Genitourinary: Negative.    Musculoskeletal: Positive for arthralgias.   Skin: Negative.   "  Allergic/Immunologic: Negative.    Neurological: Negative.    Hematological: Negative.        The following portions of the patient's history were reviewed and updated as appropriate: allergies, current medications, past family history, past medical history, past social history, past surgical history and problem list.    Physical Exam:   Pulse 59   Ht 154.9 cm (61\")   Wt 47 kg (103 lb 9.9 oz)   SpO2 98%   BMI 19.58 kg/m²   GENERAL: Body habitus: normal weight for height    Lower extremity edema: Right: none; Leftt: none    Varicose veins:  Right: none; Left: none    Gait: normal     Mental Status:  awake and alert; oriented to person, place, and time    Voice:  clear  SKIN:  Normal and warm and dry    Hair Growth:  Right:normal; Left:  normal  NAILS: Toenails: normal  HEENT: Head: Normocephalic, atraumatic,  without obvious abnormality.  eye: normal external eye, no icterus  ears: normal external ears  nose: normal external nose  pharynx: dental hygiene adequate  PULM:  Repiratory effort normal  CV:  Dorsalis Pedis:  Right: 2+; Left:2+    Posterior Tibial: Right:2+; Left:2+    Capillary Refill:  Brisk  MSK:  Hand:right handed and sensation intact      Tibia:  Right:  non tender; Left:  non tender      Ankle:  Right: non tender, ROM  normal and symmetric and motor function  normal; Left:  non tender, ROM  normal and symmetric and motor function  normal      Foot:  Right:  Tender over the right fifth metatarsal head, tender laterally, tender dorsally, not as tender plantarly, faint redness and some thickening of the bursa, no signs of infection, no other tenderness in the foot.  The first MTPJ is stiff but not particularly tender.; Left:  non tender      NEURO: Heel Walking:  Right:  normal; Left:  normal    Toe Walking:  Right:  limited ability, painful; Left:  normal     [place] 5.07 monofilament test: normal    Lower extremity sensation: intact     Reflexes:  Biceps:  Right:  1+; Left:  1+           " Quads:  Right:  2+; Left:  2+           Ankle:  Right:  1+; Left:  1+      Calf Atrophy:none    Motor Function: all 5/5         Medical Decision Making    Data Review:   ordered and reviewed x-rays today    Assessment and Plan/ Diagnosis/Treatment options:   1. Right foot pain  This pain is somewhat consistent with bunionette pain, but it  even in the boot although she reports it does not hurt in the boot.  I want to make sure that we are not missing a stress fracture.  I explained otf's, AKA alfred's bunions to her.  We talked about how feet change over time.  I want to do an MRI to make sure were not missing a stress fracture.  I will see her back following that.  In the interim she should stay in the boot since its comfortable, and decrease her activity.  - XR Foot 3+ View Right  - MRI Foot Right Without Contrast; Future    2. Acquired hallux rigidus of right foot  Right hallux rigidus, moderate to severe on x-ray, but not tender today.  It may be that she has more stiffness in the first toe joint which puts more stress over the fifth metatarsal as the foot supinates to avoid the stiffness.

## 2019-07-10 ENCOUNTER — HOSPITAL ENCOUNTER (OUTPATIENT)
Dept: CT IMAGING | Facility: HOSPITAL | Age: 69
Discharge: HOME | End: 2019-07-10
Admitting: INTERNAL MEDICINE

## 2019-07-10 PROCEDURE — 0 IOPAMIDOL PER 1 ML: Performed by: INTERNAL MEDICINE

## 2019-07-10 PROCEDURE — 71275 CT ANGIOGRAPHY CHEST: CPT

## 2019-07-10 PROCEDURE — 82565 ASSAY OF CREATININE: CPT

## 2019-07-10 RX ADMIN — IOPAMIDOL 80 ML: 755 INJECTION, SOLUTION INTRAVENOUS at 12:05

## 2019-07-16 ENCOUNTER — APPOINTMENT (OUTPATIENT)
Dept: MRI IMAGING | Facility: HOSPITAL | Age: 69
End: 2019-07-16

## 2019-07-16 LAB — CREAT BLDA-MCNC: 0.7 MG/DL (ref 0.6–1.3)

## 2019-07-17 ENCOUNTER — HOSPITAL ENCOUNTER (OUTPATIENT)
Dept: MRI IMAGING | Facility: HOSPITAL | Age: 69
Discharge: HOME | End: 2019-07-17
Admitting: ORTHOPAEDIC SURGERY

## 2019-07-17 DIAGNOSIS — M79.671 RIGHT FOOT PAIN: ICD-10-CM

## 2019-07-17 PROCEDURE — 73718 MRI LOWER EXTREMITY W/O DYE: CPT

## 2019-07-22 ENCOUNTER — OFFICE VISIT (OUTPATIENT)
Dept: ORTHOPEDIC SURGERY | Facility: CLINIC | Age: 69
End: 2019-07-22

## 2019-07-22 VITALS — HEART RATE: 65 BPM | BODY MASS INDEX: 21.71 KG/M2 | WEIGHT: 115 LBS | OXYGEN SATURATION: 95 % | HEIGHT: 61 IN

## 2019-07-22 DIAGNOSIS — M19.071 ARTHRITIS OF FOOT, RIGHT: ICD-10-CM

## 2019-07-22 DIAGNOSIS — M20.21 ACQUIRED HALLUX RIGIDUS OF RIGHT FOOT: Primary | ICD-10-CM

## 2019-07-22 PROCEDURE — 99212 OFFICE O/P EST SF 10 MIN: CPT | Performed by: ORTHOPAEDIC SURGERY

## 2019-08-06 ENCOUNTER — TRANSCRIBE ORDERS (OUTPATIENT)
Dept: ADMINISTRATIVE | Facility: HOSPITAL | Age: 69
End: 2019-08-06

## 2019-08-06 DIAGNOSIS — Z12.31 VISIT FOR SCREENING MAMMOGRAM: Primary | ICD-10-CM

## 2019-09-17 ENCOUNTER — TRANSCRIBE ORDERS (OUTPATIENT)
Dept: ADMINISTRATIVE | Facility: HOSPITAL | Age: 69
End: 2019-09-17

## 2019-09-17 ENCOUNTER — HOSPITAL ENCOUNTER (OUTPATIENT)
Dept: GENERAL RADIOLOGY | Facility: HOSPITAL | Age: 69
Discharge: HOME | End: 2019-09-17
Admitting: INTERNAL MEDICINE

## 2019-09-17 DIAGNOSIS — M25.571 PAIN IN JOINT OF RIGHT ANKLE: Primary | ICD-10-CM

## 2019-09-17 PROCEDURE — 73610 X-RAY EXAM OF ANKLE: CPT

## 2019-09-20 ENCOUNTER — OFFICE VISIT (OUTPATIENT)
Dept: ORTHOPEDIC SURGERY | Facility: CLINIC | Age: 69
End: 2019-09-20

## 2019-09-20 VITALS — WEIGHT: 115.08 LBS | HEIGHT: 61 IN | HEART RATE: 69 BPM | BODY MASS INDEX: 21.73 KG/M2 | OXYGEN SATURATION: 98 %

## 2019-09-20 DIAGNOSIS — M19.071 OSTEOARTHRITIS OF RIGHT ANKLE OR FOOT: Primary | ICD-10-CM

## 2019-09-20 PROBLEM — M19.079 OSTEOARTHRITIS OF ANKLE OR FOOT: Status: ACTIVE | Noted: 2019-09-20

## 2019-09-20 PROCEDURE — 99212 OFFICE O/P EST SF 10 MIN: CPT | Performed by: ORTHOPAEDIC SURGERY

## 2019-09-20 NOTE — PROGRESS NOTES
ESTABLISHED PATIENT    Patient: Nicki Kang  : 1950    Primary Care Provider: Domo Zamora MD    Requesting Provider: As above    Pain of the Right Ankle      History    Chief Complaint: Right ankle pain    History of Present Illness: She returns unexpectedly.  Earlier in the week she was sitting at dinner and got up with severe pain in the right ankle.  She had difficulty walking for several days.  She put her short boot back on and that helped.  Now the pain has essentially resolved.  She noted possibly slight swelling over the medial malleolus, she was tender on the malleolus and on the joint.  She had ridden her bike and walk for exercise that day, no unusual activity, no specific injury.    Current Outpatient Medications on File Prior to Visit   Medication Sig Dispense Refill   • hydrALAZINE (APRESOLINE) 10 MG tablet 4 tabs po every 12 hours (Patient taking differently: Take 25 mg by mouth 2 (Two) Times a Day.) 240 tablet 6   • propranolol (INDERAL) 10 MG tablet 2 tabs po every 12 hours (Patient taking differently: Take 5 mg by mouth 2 (Two) Times a Day. 2 tabs po every 12 hours) 120 tablet 3     No current facility-administered medications on file prior to visit.       Allergies   Allergen Reactions   • Losartan Shortness Of Breath   • Nuts Shortness Of Breath   • Soybean-Containing Drug Products Shortness Of Breath   • Piroxicam      Lethargy  and speech disturbance   • Amlodipine Other (See Comments)     Fatigue, weakness, headache   • Cardura [Doxazosin Mesylate] Other (See Comments)     headache   • Isoflavones    • Verapamil Other (See Comments)     Did not control BP  Depression    • Chlorthalidone      Fatigue and GI distress   • Codeine      Malaise   • Gabapentin Mental Status Change   • Metoprolol      Migraines and fatigue   • Paroxetine Irritability     Panic attack   • Ranitidine GI Intolerance   • Topiramate      Malaise      Past Medical History:   Diagnosis Date   • Anemia  "1956    type unknown   • Anesthesia complication     patient states after her gallbladder she was hard to wake up and the anesthesiologist told her she was a \"light weight\"   • Arrhythmia ?    PAT   • Arthritis    • BCC (basal cell carcinoma of skin) Remote   • Bicuspid aortic valve 2018   • Cervicalgia 2009    Moderately persistent   • Chronic anxiety    • Chronic gastritis    • Dizziness    • GERD (gastroesophageal reflux disease)    • Heart murmur    • HLD (hyperlipidemia)    • HTN (hypertension)    • Infectious mononucleosis 1972    Transient pericarditis   • Irritable bowel syndrome 2016    Improved with FODMAP guidelines   • Migraine    • Migraines Lifelong    marked light sensitivity   • Mononucleosis     with associated percarditis   • Paroxysmal atrial tachycardia (CMS/HCC)    • Peptic ulcer    • Pericarditis     with mono   • RLS (restless legs syndrome)    • Sleep apnea 2015    Failed CPAP   • SVT (supraventricular tachycardia) (CMS/HCC) Adulthood    With PAT   • Transient global amnesia ,     with negative neurology workup   • Transient global amnesia    • Valvular disease ?    Bicuspid aortic valve   • Vitamin B deficiency    • Vitamin D deficiency      Past Surgical History:   Procedure Laterality Date   • BASAL CELL CARCINOMA EXCISION  remote    arms   • BREAST BIOPSY Right     MRI   • BREAST EXCISIONAL BIOPSY Bilateral    • BREAST SURGERY  1970    biopsy done - benign   • CARDIAC CATHETERIZATION N/A 2018    Procedure: Left Heart Cath;  Surgeon: Mandy Henry MD;  Location: [facility] INVASIVE LOCATION;  Service: Cardiovascular   • CHOLECYSTECTOMY     • COLONOSCOPY     • ENDOSCOPY     • LASIK      corneal LASIK - persistent dry     Family History   Problem Relation Age of Onset   • Heart failure Mother          age 77   • Arrhythmia Mother    • Diabetes Father    • Stroke Father          age 78   • Arthritis Maternal " Grandmother    • Alzheimer's disease Paternal Grandmother    • Dementia Paternal Grandmother    • No Known Problems Sister    • BRCA 1/2 Neg Hx    • Breast cancer Neg Hx    • Ovarian cancer Neg Hx       Social History     Socioeconomic History   • Marital status:      Spouse name: Not on file   • Number of children: Not on file   • Years of education: Not on file   • Highest education level: Not on file   Occupational History   • Occupation: Retired     Comment:    Tobacco Use   • Smoking status: Never Smoker   • Smokeless tobacco: Never Used   Substance and Sexual Activity   • Alcohol use: No   • Drug use: No   • Sexual activity: Yes     Partners: Male     Birth control/protection: Post-menopausal   Social History Narrative    Domestic life:   Lives in private home with         Nondenominational:   [employer]        Sleep hygiene:   6 hours/night        Caffeine use:   None        Exercise habits:   Stationary cycle and PT exercises 30 minutes 6 days weekly - walks several days weekly        Diet:     Well-balanced, no almonds, soy or fish        Occupation:   Retired         Hearing    No impairment        Vision     corrects with reading glasses        Driving     no limitations         Review of Systems   Constitutional: Positive for activity change.   HENT: Positive for postnasal drip.    Eyes: Positive for photophobia, discharge, redness and itching.   Respiratory: Negative.    Cardiovascular: Positive for palpitations.   Gastrointestinal: Negative.    Endocrine: Negative.    Genitourinary: Negative.    Musculoskeletal: Positive for arthralgias.   Skin: Negative.    Allergic/Immunologic: Negative.    Neurological: Negative.    Hematological: Negative.    Psychiatric/Behavioral: Negative.        The following portions of the patient's history were reviewed and updated as appropriate: allergies, current medications, past family history, past medical history, past social history,  "past surgical history and problem list.    Physical Exam:   Pulse 69   Ht 154.9 cm (60.98\")   Wt 52.2 kg (115 lb 1.3 oz)   SpO2 98%   BMI 21.76 kg/m²   GENERAL: Body habitus: normal weight for height    Lower extremity edema: Left: none; Right: none    Gait: normal     Mental Status:  awake and alert; oriented to person, place, and time  MSK:  Tibia:  Right:  non tender; Left:  non tender        Ankle:  Right: Not tender along the joint line, no swelling, normal range of motion, no crepitus, possibly slightly tender over the medial malleolus, otherwise no tenderness; Left:  non tender        Foot:  Right:  non tender; Left:  non tender    NEURO Sensation:  intact    Medical Decision Making    Data Review:   ordered and reviewed x-rays today    Assessment/Plan/Diagnosis/Treatment Options:   1. Osteoarthritis of right ankle or foot  I suspect she aggravated the arthritis in the medial ankle joint.  She has osteochondral changes there on the MRI.  It would be normal for it to get inflamed and then the pain to gradually resolve.  She asked what to do to prevent this and I explained there is no way to really predict it.  If it recurs and is severe we can do a cortisone injection.  I will be happy to see her anytime.                            Answers for HPI/ROS submitted by the patient on 9/19/2019   Lower extremity pain  Incident occurred: 5 to 7 days ago  Incident location: other  Injury mechanism: unknown  Pain location: right ankle  Pain quality: aching  Pain - numeric: 4/10  Pain course: improving  loss of motion: Yes  muscle weakness: No  Aggravated by: movement    "

## 2019-10-07 ENCOUNTER — APPOINTMENT (OUTPATIENT)
Dept: OTHER | Facility: HOSPITAL | Age: 69
End: 2019-10-07

## 2019-10-07 ENCOUNTER — HOSPITAL ENCOUNTER (OUTPATIENT)
Dept: MAMMOGRAPHY | Facility: HOSPITAL | Age: 69
Discharge: HOME | End: 2019-10-07
Admitting: OBSTETRICS & GYNECOLOGY

## 2019-10-07 DIAGNOSIS — Z12.31 VISIT FOR SCREENING MAMMOGRAM: ICD-10-CM

## 2019-10-07 DIAGNOSIS — Z92.89 H/O MAMMOGRAM: ICD-10-CM

## 2019-10-07 PROCEDURE — 77063 BREAST TOMOSYNTHESIS BI: CPT | Performed by: RADIOLOGY

## 2019-10-07 PROCEDURE — 77063 BREAST TOMOSYNTHESIS BI: CPT

## 2019-10-07 PROCEDURE — 77067 SCR MAMMO BI INCL CAD: CPT

## 2019-10-07 PROCEDURE — 77067 SCR MAMMO BI INCL CAD: CPT | Performed by: RADIOLOGY

## 2019-10-30 ENCOUNTER — OFFICE VISIT (OUTPATIENT)
Dept: CARDIOLOGY | Facility: CLINIC | Age: 69
End: 2019-10-30

## 2019-10-30 VITALS
DIASTOLIC BLOOD PRESSURE: 84 MMHG | WEIGHT: 118.2 LBS | OXYGEN SATURATION: 99 % | HEIGHT: 61 IN | SYSTOLIC BLOOD PRESSURE: 122 MMHG | BODY MASS INDEX: 22.31 KG/M2 | HEART RATE: 62 BPM

## 2019-10-30 DIAGNOSIS — I77.810 AORTIC ROOT DILATION (HCC): ICD-10-CM

## 2019-10-30 DIAGNOSIS — Q23.1 BICUSPID AORTIC VALVE: Primary | ICD-10-CM

## 2019-10-30 DIAGNOSIS — E78.2 MIXED HYPERLIPIDEMIA: ICD-10-CM

## 2019-10-30 DIAGNOSIS — I47.1 PAROXYSMAL SUPRAVENTRICULAR TACHYCARDIA (HCC): ICD-10-CM

## 2019-10-30 DIAGNOSIS — I10 ESSENTIAL HYPERTENSION: ICD-10-CM

## 2019-10-30 PROCEDURE — 99214 OFFICE O/P EST MOD 30 MIN: CPT | Performed by: INTERNAL MEDICINE

## 2019-10-30 RX ORDER — SPIRONOLACTONE 50 MG/1
TABLET, FILM COATED ORAL DAILY
COMMUNITY
Start: 2019-10-21 | End: 2020-12-23

## 2019-10-30 RX ORDER — VITAMIN K2 40 MCG
TABLET ORAL DAILY
COMMUNITY
End: 2021-04-21

## 2019-10-30 RX ORDER — MELATONIN
1000 DAILY
COMMUNITY

## 2019-10-30 NOTE — PROGRESS NOTES
[facility]  Nicki Kang  1950  69 y.o.  [phone]  [phone]      Date: 10/30/2019    PCP: Domo Zamora MD    Chief Complaint   Patient presents with   • Bicuspid aortic valve   • Hypertension       Problem List:  1. Paroxysmal atrial tachycardia (PAT):  a. Diagnosed 1973.  b. Verapamil prescribed to use p.r.n., but patient discontinued secondary to fatigue.  c. GXT, 05/15/1996, showing PVCs in rest and recovery with no sustained arrhythmias or ischemia.  d. Reported last 24-hour Holter monitor, 1998.  e. Reported stress echo 5 years ago, normal;  data deficit.  f. Event monitor, August 2008, showing several brief runs of atrial tachycardia.  2.  Functionally bicuspid aortic valve:  a. Echo, April 1998: Moderate thickening of the mitral valve without MVP, with mild MR. The aortic valve was probably tri-leaflet with no thickening, minimal AI and EF 60%.  b. Echo, 08/11/2008: EF 55%. Tri-leaflet aortic valve with trace AI, mild-to-moderate MR/TR  c. Echo, 07/07/2014: EF 55-60% with mild MR/TR, and a trial leaflet appearing aortic valve.   d. Stress echo, 10/18/2017: EF 70%. A functionally bicuspid valve with fused left-sided cusps cannot be excluded. Mild-to-moderate AS. Mild TR/MR. Good exercise tolerance with an expected duration of 6:10, actual duration of 7:24. No evidence of inducible ischemia.  e. [facility], 06/04/2018, for unstable angina: Normal coronary arteries. Possible dilation of the ascending aorta.  f. Echo, 06/04/2018: EF 55-60%. Aortic valve appears to be bicuspid with fusion of RCC and LCC. Mild AS with mean gradient 13 mmHg. Trace AI. Mild MR/TR.   3. Dilated ascending aorta  a. CTA chest, 06/04/2018: Ascending thoracic aortic dimension of 4.2 cm.  b. CTA chest, 07/10/2019: Ascending thoracic aortic dimension of 4.0 cm. No evidence of acute intrathoracic abnormality.  4. Dyslipidemia:   a. Diet controlled.  5. Hypertension:  a. Renal artery  US, 6/16/2016: No significant renal arterial stenosis. Normal parenchymal blood flow patterns.  6. Pericarditis, 1972.  7. NOEL.  8. Anxiety.  9. Migraine headaches.  10. GERD.  11. Restless leg syndrome.  12. Insomnia.  13. Surgical history:  a. Breast  biopsy.  b. Benign lymph node removal.  c. Cholecystectomy.    Allergies   Allergen Reactions   • Losartan Shortness Of Breath   • Nuts Shortness Of Breath   • Soybean-Containing Drug Products Shortness Of Breath   • Piroxicam      Lethargy  and speech disturbance   • Amlodipine Other (See Comments)     Fatigue, weakness, headache   • Cardura [Doxazosin Mesylate] Other (See Comments)     headache   • Isoflavones    • Verapamil Other (See Comments)     Did not control BP  Depression    • Chlorthalidone      Fatigue and GI distress   • Codeine      Malaise   • Gabapentin Mental Status Change   • Metoprolol      Migraines and fatigue   • Paroxetine Irritability     Panic attack   • Ranitidine GI Intolerance   • Topiramate      Malaise       Current Medications:      Current Outpatient Medications:   •  cholecalciferol (VITAMIN D3) 25 MCG (1000 UT) tablet, Take 1,000 Units by mouth Daily., Disp: , Rfl:   •  Cyanocobalamin (VITAMIN B12) 1000 MCG tablet controlled-release, Take  by mouth Daily., Disp: , Rfl:   •  Menaquinone-7 (VITAMIN K2) 40 MCG tablet, Take  by mouth Daily., Disp: , Rfl:   •  Multiple Vitamins-Minerals (MULTIVITAMIN ADULT PO), Take  by mouth Daily., Disp: , Rfl:   •  spironolactone (ALDACTONE) 50 MG tablet, Daily., Disp: , Rfl:     HPI    Nicki Kang is a 69 y.o. female who presents today for 6 month follow up of paroxysmal atrial tachycardia, bicuspid aortic valve, aortic stenosis, hypertension, and hyperlipidemia. Since last visit, she has been feeling well overall from a cardiovascular standpoint. She denies any awareness of tachyarrhythmias. Patient denies chest pain, palpitations, shortness of breath, edema, dizziness, and syncope.  She  "continues to walk and bike approximately 30 minutes daily.      The following portions of the patient's history were reviewed and updated as appropriate: allergies, current medications and problem list.    Pertinent positives as listed in the HPI.  All other systems reviewed are negative.    Vitals:    10/30/19 1307   BP: 122/84   BP Location: Right arm   Patient Position: Sitting   Pulse: 62   SpO2: 99%   Weight: 53.6 kg (118 lb 3.2 oz)   Height: 154.9 cm (61\")       Physical Exam:    General: Alert and oriented.  Neck: Jugular venous pressure is within normal limits. Carotids have normal upstrokes without bruits.   Cardiovascular: Heart has a nondisplaced focal PMI. Regular rate and rhythm with 1-2/6 LAURA at the right sternal border. No gallop or rub.  Lungs: Clear without rales or wheezes. Equal expansion is noted.   Extremities: Show no edema.  Skin: Warm and dry.  Neurologic: Nonfocal.    Diagnostic Data:  Lab Results   Component Value Date    GLUCOSE 92 06/03/2018    BUN 21 06/03/2018    CREATININE 0.70 07/10/2019    EGFRIFNONA 83 06/03/2018    BCR 30.0 (H) 06/03/2018     06/03/2018    K 3.6 06/03/2018     06/03/2018    CO2 28.0 06/03/2018    CALCIUM 9.8 06/03/2018    ALBUMIN 4.40 06/03/2018    AST 23 06/03/2018    ALT 25 06/03/2018     Lab Results   Component Value Date    CHOL 191 06/04/2018    TRIG 92 06/04/2018    HDL 56 06/04/2018     06/04/2018    LDLDIRECT  06/22/2016      Comment:      Test not performed      Procedures     Echocardiogram, 06/04/2018:   · Left ventricular systolic function is normal. Estimated EF = 55-60%.  · The aortic valve is abnormal in structure. Appears to be bicuspid with fusion of RCC and LCC. Trace aortic valve regurgitation is present. Mild aortic valve stenosis is present.  · Mild mitral valve regurgitation is present  · Mild tricuspid valve regurgitation is present.    CTA Chest, 07/10/2019:   · 4 cm ascending thoracic aortic aneurysm. No evidence of acute " intrathoracic abnormality.  · This is a 0.2 cm decrease in diameter compared to June 2018       Assessment:      ICD-10-CM ICD-9-CM   1. Bicuspid aortic valve Q23.1 746.4   2. Essential hypertension I10 401.9   3. Aortic root dilation (CMS/HCC) I77.810 447.71   4. Paroxysmal supraventricular tachycardia (CMS/HCC) I47.1 427.0   5. Mixed hyperlipidemia E78.2 272.2     Lab results, CTA chest, and echocardiogram found above were reviewed with the patient.    Plan:    1. Stable cardiac status overall.  2. Repeat a CTA chest in two years to reassess aortic root dilation.  3. Repeat echocardiogram in a year to reassess aortic valve.  4. Continue spironolactone for hypertension and fluid retention.  5. Continue all other current medications.  6. F/up in 12 months or sooner if needed.      Scribed for Mandy Henry MD by Shana Woodwrad. 10/30/2019  1:20 PM     I Mandy Henry MD personally performed the services described in this documentation as scribed by the above individual in my presence, and it is both accurate and complete.    Mandy Henry MD, [facility]

## 2019-12-10 ENCOUNTER — TRANSCRIBE ORDERS (OUTPATIENT)
Dept: ADMINISTRATIVE | Facility: HOSPITAL | Age: 69
End: 2019-12-10

## 2019-12-10 DIAGNOSIS — Z12.31 VISIT FOR SCREENING MAMMOGRAM: Primary | ICD-10-CM

## 2020-01-09 ENCOUNTER — TRANSCRIBE ORDERS (OUTPATIENT)
Dept: ADMINISTRATIVE | Facility: HOSPITAL | Age: 70
End: 2020-01-09

## 2020-01-09 DIAGNOSIS — C91.Z0: ICD-10-CM

## 2020-01-09 DIAGNOSIS — Q23.1 AORTIC VALVE INSUFFICIENCY, CONGENITAL: ICD-10-CM

## 2020-01-09 DIAGNOSIS — C92.60 AML WITH T(9;11)(P22;Q23); MLLT3-MLL (HCC): Primary | ICD-10-CM

## 2020-01-21 ENCOUNTER — HOSPITAL ENCOUNTER (OUTPATIENT)
Dept: CARDIOLOGY | Facility: HOSPITAL | Age: 70
Discharge: HOME | End: 2020-01-21
Admitting: INTERNAL MEDICINE

## 2020-01-21 VITALS — WEIGHT: 118 LBS | BODY MASS INDEX: 22.28 KG/M2 | HEIGHT: 61 IN

## 2020-01-21 DIAGNOSIS — Q23.1 AORTIC VALVE INSUFFICIENCY, CONGENITAL: ICD-10-CM

## 2020-01-21 PROCEDURE — 93306 TTE W/DOPPLER COMPLETE: CPT

## 2020-01-21 PROCEDURE — 93306 TTE W/DOPPLER COMPLETE: CPT | Performed by: INTERNAL MEDICINE

## 2020-01-22 LAB
AORTIC DIMENSIONLESS INDEX: 0.2 (DI)
BH CV ECHO MEAS - AO MAX PG (FULL): 25 MMHG
BH CV ECHO MEAS - AO MAX PG: 29 MMHG
BH CV ECHO MEAS - AO MEAN PG (FULL): 13.5 MMHG
BH CV ECHO MEAS - AO MEAN PG: 15 MMHG
BH CV ECHO MEAS - AO ROOT AREA (BSA CORRECTED): 2.1
BH CV ECHO MEAS - AO ROOT AREA: 7.5 CM^2
BH CV ECHO MEAS - AO ROOT DIAM: 3.1 CM
BH CV ECHO MEAS - AO V2 MAX: 270 CM/SEC
BH CV ECHO MEAS - AO V2 MEAN: 179 CM/SEC
BH CV ECHO MEAS - AO V2 VTI: 62.7 CM
BH CV ECHO MEAS - AVA(I,A): 1.1 CM^2
BH CV ECHO MEAS - AVA(I,D): 1.1 CM^2
BH CV ECHO MEAS - AVA(V,A): 1.1 CM^2
BH CV ECHO MEAS - AVA(V,D): 1.1 CM^2
BH CV ECHO MEAS - BSA(HAYCOCK): 1.5 M^2
BH CV ECHO MEAS - BSA: 1.5 M^2
BH CV ECHO MEAS - BZI_BMI: 22.3 KILOGRAMS/M^2
BH CV ECHO MEAS - BZI_METRIC_HEIGHT: 154.9 CM
BH CV ECHO MEAS - BZI_METRIC_WEIGHT: 53.5 KG
BH CV ECHO MEAS - EDV(CUBED): 89.3 ML
BH CV ECHO MEAS - EDV(MOD-SP2): 84 ML
BH CV ECHO MEAS - EDV(MOD-SP4): 77 ML
BH CV ECHO MEAS - EDV(TEICH): 91 ML
BH CV ECHO MEAS - EF(CUBED): 79.2 %
BH CV ECHO MEAS - EF(MOD-BP): 63 %
BH CV ECHO MEAS - EF(MOD-SP2): 60.7 %
BH CV ECHO MEAS - EF(MOD-SP4): 64.9 %
BH CV ECHO MEAS - EF(TEICH): 71.7 %
BH CV ECHO MEAS - ESV(CUBED): 18.6 ML
BH CV ECHO MEAS - ESV(MOD-SP2): 33 ML
BH CV ECHO MEAS - ESV(MOD-SP4): 27 ML
BH CV ECHO MEAS - ESV(TEICH): 25.8 ML
BH CV ECHO MEAS - FS: 40.7 %
BH CV ECHO MEAS - IVS/LVPW: 1.2
BH CV ECHO MEAS - IVSD: 0.97 CM
BH CV ECHO MEAS - LA DIMENSION: 3.3 CM
BH CV ECHO MEAS - LA/AO: 1.1
BH CV ECHO MEAS - LAD MAJOR: 3.3 CM
BH CV ECHO MEAS - LAT PEAK E' VEL: 7.5 CM/SEC
BH CV ECHO MEAS - LATERAL E/E' RATIO: 9.1
BH CV ECHO MEAS - LV DIASTOLIC VOL/BSA (35-75): 51 ML/M^2
BH CV ECHO MEAS - LV MASS(C)D: 128.4 GRAMS
BH CV ECHO MEAS - LV MASS(C)DI: 85.1 GRAMS/M^2
BH CV ECHO MEAS - LV MAX PG: 2 MMHG
BH CV ECHO MEAS - LV MEAN PG: 1 MMHG
BH CV ECHO MEAS - LV SYSTOLIC VOL/BSA (12-30): 17.9 ML/M^2
BH CV ECHO MEAS - LV V1 MAX: 70.7 CM/SEC
BH CV ECHO MEAS - LV V1 MEAN: 48.1 CM/SEC
BH CV ECHO MEAS - LV V1 VTI: 16 CM
BH CV ECHO MEAS - LVIDD: 4.5 CM
BH CV ECHO MEAS - LVIDS: 2.7 CM
BH CV ECHO MEAS - LVLD AP2: 7.7 CM
BH CV ECHO MEAS - LVLD AP4: 7.2 CM
BH CV ECHO MEAS - LVLS AP2: 6.3 CM
BH CV ECHO MEAS - LVLS AP4: 5.7 CM
BH CV ECHO MEAS - LVOT AREA (M): 4.2 CM^2
BH CV ECHO MEAS - LVOT AREA: 4.2 CM^2
BH CV ECHO MEAS - LVOT DIAM: 2.3 CM
BH CV ECHO MEAS - LVPWD: 0.8 CM
BH CV ECHO MEAS - MED PEAK E' VEL: 6.9 CM/SEC
BH CV ECHO MEAS - MEDIAL E/E' RATIO: 9.9
BH CV ECHO MEAS - MV A MAX VEL: 88.4 CM/SEC
BH CV ECHO MEAS - MV DEC TIME: 0.23 SEC
BH CV ECHO MEAS - MV E MAX VEL: 68.1 CM/SEC
BH CV ECHO MEAS - MV E/A: 0.77
BH CV ECHO MEAS - PA ACC SLOPE: 381 CM/SEC^2
BH CV ECHO MEAS - PA ACC TIME: 0.19 SEC
BH CV ECHO MEAS - PA MAX PG: 2.5 MMHG
BH CV ECHO MEAS - PA PR(ACCEL): -5.2 MMHG
BH CV ECHO MEAS - PA V2 MAX: 78.7 CM/SEC
BH CV ECHO MEAS - PI END-D VEL: 117 CM/SEC
BH CV ECHO MEAS - RAP SYSTOLE: 3 MMHG
BH CV ECHO MEAS - RVSP: 21 MMHG
BH CV ECHO MEAS - SI(AO): 313.5 ML/M^2
BH CV ECHO MEAS - SI(CUBED): 46.8 ML/M^2
BH CV ECHO MEAS - SI(LVOT): 44 ML/M^2
BH CV ECHO MEAS - SI(MOD-SP2): 33.8 ML/M^2
BH CV ECHO MEAS - SI(MOD-SP4): 33.1 ML/M^2
BH CV ECHO MEAS - SI(TEICH): 43.2 ML/M^2
BH CV ECHO MEAS - SV(AO): 473.2 ML
BH CV ECHO MEAS - SV(CUBED): 70.7 ML
BH CV ECHO MEAS - SV(LVOT): 66.5 ML
BH CV ECHO MEAS - SV(MOD-SP2): 51 ML
BH CV ECHO MEAS - SV(MOD-SP4): 50 ML
BH CV ECHO MEAS - SV(TEICH): 65.2 ML
BH CV ECHO MEAS - TAPSE (>1.6): 2.6 CM2
BH CV ECHO MEAS - TR MAX PG: 18 MMHG
BH CV ECHO MEAS - TR MAX VEL: 209.7 CM/SEC
BH CV ECHO MEASUREMENTS AVERAGE E/E' RATIO: 9.46
BH CV VAS BP RIGHT ARM: NORMAL MMHG
BH CV XLRA - RV BASE: 2.6 CM
BH CV XLRA - RV LENGTH: 6.7 CM
BH CV XLRA - RV MID: 2.3 CM
BH CV XLRA - TDI S': 14.5 CM/SEC
LEFT ATRIUM VOLUME INDEX: 13.9 ML/M^2
LEFT ATRIUM VOLUME: 21 ML
LV EF 2D ECHO EST: 60 %

## 2020-09-29 ENCOUNTER — TRANSCRIBE ORDERS (OUTPATIENT)
Dept: ADMINISTRATIVE | Facility: HOSPITAL | Age: 70
End: 2020-09-29

## 2020-09-29 ENCOUNTER — HOSPITAL ENCOUNTER (OUTPATIENT)
Dept: MRI IMAGING | Facility: HOSPITAL | Age: 70
Discharge: HOME | End: 2020-09-29
Admitting: INTERNAL MEDICINE

## 2020-09-29 DIAGNOSIS — R51.9 NONINTRACTABLE HEADACHE, UNSPECIFIED CHRONICITY PATTERN, UNSPECIFIED HEADACHE TYPE: Primary | ICD-10-CM

## 2020-09-29 DIAGNOSIS — R51.9 NONINTRACTABLE HEADACHE, UNSPECIFIED CHRONICITY PATTERN, UNSPECIFIED HEADACHE TYPE: ICD-10-CM

## 2020-09-29 LAB — CREAT BLDA-MCNC: 0.6 MG/DL (ref 0.6–1.3)

## 2020-09-29 PROCEDURE — 70553 MRI BRAIN STEM W/O & W/DYE: CPT

## 2020-09-29 PROCEDURE — 82565 ASSAY OF CREATININE: CPT

## 2020-09-29 PROCEDURE — 0 GADOBENATE DIMEGLUMINE 529 MG/ML SOLUTION: Performed by: INTERNAL MEDICINE

## 2020-09-29 PROCEDURE — A9577 INJ MULTIHANCE: HCPCS | Performed by: INTERNAL MEDICINE

## 2020-09-29 RX ADMIN — GADOBENATE DIMEGLUMINE 10 ML: 529 INJECTION, SOLUTION INTRAVENOUS at 15:48

## 2020-10-08 ENCOUNTER — APPOINTMENT (OUTPATIENT)
Dept: MAMMOGRAPHY | Facility: HOSPITAL | Age: 70
End: 2020-10-08

## 2020-11-09 ENCOUNTER — OFFICE VISIT (OUTPATIENT)
Dept: OBSTETRICS AND GYNECOLOGY | Facility: CLINIC | Age: 70
End: 2020-11-09

## 2020-11-09 VITALS
DIASTOLIC BLOOD PRESSURE: 90 MMHG | WEIGHT: 120 LBS | SYSTOLIC BLOOD PRESSURE: 131 MMHG | HEIGHT: 61 IN | BODY MASS INDEX: 22.66 KG/M2

## 2020-11-09 DIAGNOSIS — Z13.820 SCREENING FOR OSTEOPOROSIS: ICD-10-CM

## 2020-11-09 DIAGNOSIS — Z01.419 WOMEN'S ANNUAL ROUTINE GYNECOLOGICAL EXAMINATION: Primary | ICD-10-CM

## 2020-11-09 DIAGNOSIS — Z12.4 SCREENING FOR CERVICAL CANCER: ICD-10-CM

## 2020-11-09 DIAGNOSIS — Z12.31 ENCOUNTER FOR SCREENING MAMMOGRAM FOR MALIGNANT NEOPLASM OF BREAST: ICD-10-CM

## 2020-11-09 DIAGNOSIS — M81.6 LOCALIZED OSTEOPOROSIS (LEQUESNE): ICD-10-CM

## 2020-11-09 PROCEDURE — G0101 CA SCREEN;PELVIC/BREAST EXAM: HCPCS | Performed by: NURSE PRACTITIONER

## 2020-11-09 RX ORDER — PROPAFENONE HYDROCHLORIDE 300 MG/1
300 TABLET, COATED ORAL AS NEEDED
COMMUNITY

## 2020-11-09 RX ORDER — OXCARBAZEPINE 150 MG/1
75 TABLET, FILM COATED ORAL 2 TIMES DAILY
COMMUNITY

## 2020-11-09 RX ORDER — ETHACRYNIC ACID 25 MG/1
TABLET ORAL
COMMUNITY
Start: 2020-09-08

## 2020-11-09 RX ORDER — MINOXIDIL 2.5 MG/1
1.25 TABLET ORAL 3 TIMES DAILY
COMMUNITY
Start: 2020-09-18

## 2020-11-09 RX ORDER — BACLOFEN 20 MG
1000 TABLET ORAL DAILY
COMMUNITY

## 2020-11-09 NOTE — PROGRESS NOTES
GYN Annual Exam     CC - Here for annual exam.        HPI  Nicki Kang is a 70 y.o. female, , who presents for annual well woman exam.  She is postmenopausal.  Patient denies vaginal bleeding. ..  Patient reports problems with: none. There were no changes to her medical or surgical history since her last visit.. Partner Status: Marital Status: .  New Partners since last visit: no.      Additional OB/GYN History   Current contraception: contraceptive methods: Post menopausal status  Desires to: do not start contraception  On HRT? No  Last Pap : 2019  Last Completed Pap Smear       Status Date      PAP SMEAR Done 2019 negative per [place]        History of abnormal Pap smear: no  Family history of uterine, colon, breast, or ovarian cancer: no  Performs monthly Self-Breast Exam: no  Last mammogram: 10/7/2019  Last Completed Mammogram       Status Date      MAMMOGRAM Done 10/7/2019 MAMMO SCREENING DIGITAL TOMOSYNTHESIS BILATERAL W CAD     Patient has more history with this topic...        Last colonoscopy:   Last Completed Colonoscopy       Status Date      COLONOSCOPY Done 2016 SCANNED - COLONOSCOPY        Last DEXA: On 2019 and results were Osteopenia and Osteoporosis  Exercises Regularly: yes  Feelings of Anxiety or Depression: yes - Anxiety and depression untreated. No treatment requested      Tobacco Usage?: No   OB History        1    Para   1    Term   1            AB        Living   1       SAB        TAB        Ectopic        Molar        Multiple        Live Births                    Health Maintenance   Topic Date Due   • ZOSTER VACCINE (1 of 2) 2001   • LIPID PANEL  2019   • INFLUENZA VACCINE  2020   • ANNUAL WELLNESS VISIT  2021   • PAP SMEAR  2021   • MAMMOGRAM  10/07/2021   • TDAP/TD VACCINES (2 - Td) 2024   • COLONOSCOPY  2026   • HEPATITIS C SCREENING  Completed   • Pneumococcal Vaccine 65+   "Completed       The additional following portions of the patient's history were reviewed and updated as appropriate: allergies, current medications, past family history, past medical history, past social history, past surgical history and problem list.    Review of Systems   Constitutional: Negative.    HENT: Negative.    Eyes: Negative.    Respiratory: Negative.    Cardiovascular: Negative.    Gastrointestinal: Negative.    Endocrine: Negative.    Genitourinary: Negative.    Musculoskeletal: Negative.    Allergic/Immunologic: Negative.    Neurological: Negative.    Hematological: Negative.    Psychiatric/Behavioral: The patient is nervous/anxious.      All other systems reviewed and are negative.     I have reviewed and agree with the HPI, ROS, and historical information as entered above. Zora Powell, APRN    Objective   /90   Ht 154.9 cm (61\")   Wt 54.4 kg (120 lb)   Breastfeeding No   BMI 22.67 kg/m²     Physical Exam  Vitals signs and nursing note reviewed. Exam conducted with a chaperone present.   Constitutional:       Appearance: She is well-developed.   HENT:      Head: Normocephalic and atraumatic.   Neck:      Musculoskeletal: Normal range of motion. No muscular tenderness.      Thyroid: No thyroid mass or thyromegaly.   Cardiovascular:      Rate and Rhythm: Normal rate and regular rhythm.      Heart sounds: No murmur.   Pulmonary:      Effort: Pulmonary effort is normal. No retractions.      Breath sounds: Normal breath sounds. No wheezing, rhonchi or rales.   Chest:      Chest wall: No mass or tenderness.      Breasts:         Right: Normal. No mass, nipple discharge, skin change or tenderness.         Left: Normal. No mass, nipple discharge, skin change or tenderness.   Abdominal:      Palpations: Abdomen is soft. Abdomen is not rigid. There is no mass.      Tenderness: There is no abdominal tenderness. There is no guarding.      Hernia: No hernia is present. There is no hernia in the " left inguinal area.   Genitourinary:     Labia:         Right: No rash, tenderness or lesion.         Left: No rash, tenderness or lesion.       Vagina: Normal. No vaginal discharge or lesions.      Cervix: No cervical motion tenderness, discharge, lesion or cervical bleeding.      Uterus: Normal. Not enlarged, not fixed and not tender.       Adnexa:         Right: No mass or tenderness.          Left: No mass or tenderness.        Rectum: No external hemorrhoid.   Neurological:      Mental Status: She is alert and oriented to person, place, and time.   Psychiatric:         Behavior: Behavior normal.            Assessment and Plan    Problem List Items Addressed This Visit     None      Visit Diagnoses     Women's annual routine gynecological examination    -  Primary    Screening for cervical cancer        Relevant Orders    Pap IG, Rfx HPV ASCU    Screening for osteoporosis        Relevant Orders    DEXA Bone Density Axial    Localized osteoporosis ([place])         Relevant Orders    DEXA Bone Density Axial    Encounter for screening mammogram for malignant neoplasm of breast        Relevant Orders    Mammo Screening Digital Tomosynthesis Bilateral With CAD          1. GYN annual well woman exam.   2. Reviewed monthly self breast exams.  Instructed to call with lumps, pain, or breast discharge.  Yearly mammograms ordered.  3. Ordered mammogram today.  4. Recommended use of Vitamin D and getting adequate calcium in her diet. (1500mg)  5. Osteoporosis screening ordered today.  6. Reviewed exercise as a preventative health measures.    7. Bone health sheet given and reviewed. Worsening osteoporosis of lumbar spine. We discussed referral to [facility]. She has never taken medication for osteoporosis and she prefers to see and speak to her PCP prior to proceeding. Copy of report was given to the patient.     Zora Powell, APRN  11/09/2020

## 2020-11-12 DIAGNOSIS — Z12.4 SCREENING FOR CERVICAL CANCER: ICD-10-CM

## 2020-11-16 NOTE — PROGRESS NOTES
ESTABLISHED PATIENT    Patient: Nicki Kang  : 1950    Primary Care Provider: Peyton Yanez MD    Requesting Provider: As above    Follow-up (MRI RIght foot 19)      History    Chief Complaint: Right foot pain    History of Present Illness: She returns for follow-up of the MRI of the right foot dated 2019.  The MRI shows some mild arthritis in the fifth metatarsal phalangeal joint, no stress fracture.  The MRI also shows the expected arthritis in the first MTP joint.  She also has some arthritis with a subchondral degenerative area area in the ankle.  She is wearing the boot, she reports less pain.    Current Outpatient Medications on File Prior to Visit   Medication Sig Dispense Refill   • hydrALAZINE (APRESOLINE) 10 MG tablet 4 tabs po every 12 hours (Patient taking differently: Take 25 mg by mouth 2 (Two) Times a Day.) 240 tablet 6   • propranolol (INDERAL) 10 MG tablet 2 tabs po every 12 hours (Patient taking differently: Take 5 mg by mouth 2 (Two) Times a Day. 2 tabs po every 12 hours) 120 tablet 3     No current facility-administered medications on file prior to visit.       Allergies   Allergen Reactions   • Losartan Shortness Of Breath   • Nuts Shortness Of Breath   • Soybean-Containing Drug Products Shortness Of Breath   • Piroxicam      Lethargy  and speech disturbance   • Amlodipine Other (See Comments)     Fatigue, weakness, headache   • Cardura [Doxazosin Mesylate] Other (See Comments)     headache   • Isoflavones    • Verapamil Other (See Comments)     Did not control BP  Depression    • Chlorthalidone      Fatigue and GI distress   • Codeine      Malaise   • Gabapentin Mental Status Change   • Metoprolol      Migraines and fatigue   • Paroxetine Irritability     Panic attack   • Ranitidine GI Intolerance   • Topiramate      Malaise      Past Medical History:   Diagnosis Date   • Anemia     type unknown   • Anesthesia complication     patient states after her  Patient notified. "gallbladder she was hard to wake up and the anesthesiologist told her she was a \"light weight\"   • Arrhythmia ?    PAT   • Arthritis    • BCC (basal cell carcinoma of skin) Remote   • Bicuspid aortic valve 2018   • Cervicalgia 2009    Moderately persistent   • Chronic anxiety    • Chronic gastritis 2016   • Dizziness    • GERD (gastroesophageal reflux disease)    • Heart murmur    • HLD (hyperlipidemia)    • HTN (hypertension)    • Infectious mononucleosis 1972    Transient pericarditis   • Irritable bowel syndrome 2016    Improved with FODMAP guidelines   • Migraine    • Migraines Lifelong    marked light sensitivity   • Mononucleosis     with associated percarditis   • Paroxysmal atrial tachycardia (CMS/HCC)    • Peptic ulcer    • Pericarditis     with mono   • RLS (restless legs syndrome)    • Sleep apnea     Failed CPAP   • SVT (supraventricular tachycardia) (CMS/HCC) Adulthood    With PAT   • Transient global amnesia ,     with negative neurology workup   • Transient global amnesia    • Valvular disease ?    Bicuspid aortic valve   • Vitamin B deficiency    • Vitamin D deficiency      Past Surgical History:   Procedure Laterality Date   • BASAL CELL CARCINOMA EXCISION  remote    arms   • BREAST BIOPSY Right     MRI   • BREAST EXCISIONAL BIOPSY Bilateral    • BREAST SURGERY  1970    biopsy done - benign   • CARDIAC CATHETERIZATION N/A 2018    Procedure: Left Heart Cath;  Surgeon: Mandy Henry MD;  Location: [facility] CATH INVASIVE LOC[facility];  Service: Cardiovascular   • CHOLECYSTECTOMY     • COLONOSCOPY     • ENDOSCOPY     • LASIK      corneal LASIK - persistent dry     Family History   Problem Relation Age of Onset   • Heart failure Mother          age 77   • Arrhythmia Mother    • Diabetes Father    • Stroke Father          age 78   • Arthritis Maternal Grandmother    • Alzheimer's disease Paternal Grandmother    • Dementia Paternal " "Grandmother    • No Known Problems Sister    • BRCA 1/2 Neg Hx    • Breast cancer Neg Hx    • Ovarian cancer Neg Hx       Social History     Socioeconomic History   • Marital status:      Spouse name: Not on file   • Number of children: Not on file   • Years of education: Not on file   • Highest education level: Not on file   Occupational History   • Occupation: Retired     Comment:    Tobacco Use   • Smoking status: Never Smoker   • Smokeless tobacco: Never Used   Substance and Sexual Activity   • Alcohol use: No   • Drug use: No   • Sexual activity: Yes     Partners: Male     Birth control/protection: Post-menopausal   Social History Narrative    Domestic life:   Lives in private home with         Synagogue:   [employer]        Sleep hygiene:   6 hours/night        Caffeine use:   None        Exercise habits:   Stationary cycle and PT exercises 30 minutes 6 days weekly - walks several days weekly        Diet:     Well-balanced, no almonds, soy or fish        Occupation:   Retired         Hearing    No impairment        Vision     corrects with reading glasses        Driving     no limitations         Review of Systems   Constitutional: Negative.    HENT: Negative.    Eyes: Negative.    Respiratory: Negative.    Cardiovascular: Negative.    Gastrointestinal: Negative.    Endocrine: Negative.    Genitourinary: Negative.    Musculoskeletal: Positive for arthralgias.   Skin: Negative.    Allergic/Immunologic: Negative.    Neurological: Negative.    Hematological: Negative.    Psychiatric/Behavioral: Negative.        The following portions of the patient's history were reviewed and updated as appropriate: allergies, current medications, past family history, past medical history, past social history, past surgical history and problem list.    Physical Exam:   Pulse 65   Ht 154.9 cm (61\")   Wt 52.2 kg (115 lb)   SpO2 95%   BMI 21.73 kg/m²   GENERAL: Gait: normal   "     MSK:  Ankle:  Right: non tender; Left:  non tender        Foot:  Right:  Slightly tender over the fifth metatarsal phalangeal joint, no swelling today, not tender in the stiff first MTPJ;  NEURO Sensation:  intact     Medical Decision Making    Data Review:   reviewed radiology images and reviewed radiology results    Assessment/Plan/Diagnosis/Treatment Options:   1. Acquired hallux rigidus of right foot  She has arthritis, early in the fifth MTPJ.  The way I can put this together is she transferred more weight laterally in the foot from the stiffness in the first MTPJ.  That somewhat overloaded the fifth.  There is no stress fracture.  We discussed injection, but she does not think it is painful enough for that.  I do recommend adding a metatarsal pad to that part of her orthotic and I wrote a note for this.  We also talked about Voltaren gel, she has some at home and she is going to try this.  I will be happy to see her again anytime if this does not help enough    2. Arthritis of foot, right  As above

## 2020-11-23 ENCOUNTER — HOSPITAL ENCOUNTER (OUTPATIENT)
Dept: MAMMOGRAPHY | Facility: HOSPITAL | Age: 70
Discharge: HOME | End: 2020-11-23
Admitting: OBSTETRICS & GYNECOLOGY

## 2020-11-23 DIAGNOSIS — Z12.31 VISIT FOR SCREENING MAMMOGRAM: ICD-10-CM

## 2020-11-23 PROCEDURE — 77063 BREAST TOMOSYNTHESIS BI: CPT | Performed by: RADIOLOGY

## 2020-11-23 PROCEDURE — 77063 BREAST TOMOSYNTHESIS BI: CPT

## 2020-11-23 PROCEDURE — 77067 SCR MAMMO BI INCL CAD: CPT | Performed by: RADIOLOGY

## 2020-11-23 PROCEDURE — 77067 SCR MAMMO BI INCL CAD: CPT

## 2020-12-23 ENCOUNTER — OFFICE VISIT (OUTPATIENT)
Dept: CARDIOLOGY | Facility: CLINIC | Age: 70
End: 2020-12-23

## 2020-12-23 VITALS
HEIGHT: 61 IN | HEART RATE: 56 BPM | BODY MASS INDEX: 21.52 KG/M2 | DIASTOLIC BLOOD PRESSURE: 81 MMHG | WEIGHT: 114 LBS | SYSTOLIC BLOOD PRESSURE: 133 MMHG

## 2020-12-23 DIAGNOSIS — Q23.1 BICUSPID AORTIC VALVE: ICD-10-CM

## 2020-12-23 DIAGNOSIS — I10 ESSENTIAL HYPERTENSION: ICD-10-CM

## 2020-12-23 DIAGNOSIS — E78.2 MIXED HYPERLIPIDEMIA: ICD-10-CM

## 2020-12-23 DIAGNOSIS — I77.810 AORTIC ROOT DILATION (HCC): Primary | ICD-10-CM

## 2020-12-23 DIAGNOSIS — I47.1 PAROXYSMAL SUPRAVENTRICULAR TACHYCARDIA (HCC): ICD-10-CM

## 2020-12-23 PROCEDURE — 99441 PR PHYS/QHP TELEPHONE EVALUATION 5-10 MIN: CPT | Performed by: NURSE PRACTITIONER

## 2020-12-23 RX ORDER — PRAVASTATIN SODIUM 20 MG
20 TABLET ORAL DAILY
COMMUNITY

## 2020-12-23 RX ORDER — LANOLIN ALCOHOL/MO/W.PET/CERES
500 CREAM (GRAM) TOPICAL NIGHTLY
COMMUNITY
End: 2021-04-21

## 2021-01-15 ENCOUNTER — OFFICE VISIT (OUTPATIENT)
Dept: ORTHOPEDIC SURGERY | Facility: CLINIC | Age: 71
End: 2021-01-15

## 2021-01-15 VITALS — OXYGEN SATURATION: 97 % | HEIGHT: 61 IN | HEART RATE: 88 BPM | BODY MASS INDEX: 21.52 KG/M2 | WEIGHT: 113.98 LBS

## 2021-01-15 DIAGNOSIS — M79.674 TOE PAIN, RIGHT: Primary | ICD-10-CM

## 2021-01-15 PROCEDURE — 99213 OFFICE O/P EST LOW 20 MIN: CPT | Performed by: ORTHOPAEDIC SURGERY

## 2021-01-15 PROCEDURE — 20612 ASPIRATE/INJ GANGLION CYST: CPT | Performed by: ORTHOPAEDIC SURGERY

## 2021-01-15 RX ORDER — METHYLPREDNISOLONE ACETATE 40 MG/ML
10 INJECTION, SUSPENSION INTRA-ARTICULAR; INTRALESIONAL; INTRAMUSCULAR; SOFT TISSUE
Status: COMPLETED | OUTPATIENT
Start: 2021-01-15 | End: 2021-01-15

## 2021-01-15 RX ADMIN — METHYLPREDNISOLONE ACETATE 10 MG: 40 INJECTION, SUSPENSION INTRA-ARTICULAR; INTRALESIONAL; INTRAMUSCULAR; SOFT TISSUE at 09:35

## 2021-01-15 NOTE — PROGRESS NOTES
ESTABLISHED PATIENT    Patient: Nicki Kang  : 1950    Primary Care Provider: Domo Zamora MD    Requesting Provider: As above    Pain of the Right Foot      History    Chief Complaint: cyst right 3rd toe    History of Present Illness: Ms Kang is here with a new problem.  She has a several month history of a mucus cyst on the right 3rd toe.  It has gotten larger and smaller, hurts in shoes. It has gotten some crusty skin on the top.  No sign of infection, no fever, no chills.      Current Outpatient Medications on File Prior to Visit   Medication Sig Dispense Refill   • cholecalciferol (VITAMIN D3) 25 MCG (1000 UT) tablet Take 1,000 Units by mouth Daily.     • Cyanocobalamin (VITAMIN B12) 1000 MCG tablet controlled-release Take  by mouth Daily.     • diclofenac (Voltaren) 1 % gel gel Voltaren 1 % topical gel   prn     • ethacrynic acid (EDECRIN) 25 MG tablet ethacrynic acid 25 mg tablet   TAKE ONE-HALF TABLET BY MOUTH DAILY FOR 30 DAYS     • Magnesium Oxide 500 MG tablet magnesium oxide 500 mg tablet   Take 1 tablet every day by oral route.     • minoxidil (LONITEN) 2.5 MG tablet Take 1.25 mg by mouth 3 (Three) Times a Day.     • Multiple Vitamins-Minerals (MULTIVITAMIN ADULT PO) Take  by mouth Daily.     • niacin (SLO-NIACIN) 500 MG CR tablet Take 500 mg by mouth Every Night.     • OXcarbazepine (TRILEPTAL) 150 MG tablet Take 75 mg by mouth 2 (Two) Times a Day.     • pravastatin (PRAVACHOL) 20 MG tablet Take 20 mg by mouth Daily. Starting after      • propafenone (RYTHMOL) 300 MG tablet Take 300 mg by mouth As Needed.     • Menaquinone-7 (VITAMIN K2) 40 MCG tablet Take  by mouth Daily.       No current facility-administered medications on file prior to visit.       Allergies   Allergen Reactions   • Losartan Shortness Of Breath     Tongue swelling    • Nuts Shortness Of Breath   • Soybean-Containing Drug Products Shortness Of Breath   • Piroxicam      Lethargy   "and speech disturbance   • Amlodipine Other (See Comments)     Fatigue, weakness, headache   • Cardura [Doxazosin Mesylate] Other (See Comments)     headache   • Guanfacine Swelling   • Hydralazine Swelling   • Nefazodone Arrhythmia   • Soy Isoflavones    • Verapamil Other (See Comments)     Did not control BP  Depression    • Chlorthalidone      Fatigue and GI distress   • Codeine      Malaise   • Gabapentin Mental Status Change   • Metoprolol      Migraines and fatigue   • Paroxetine Irritability     Panic attack   • Ranitidine GI Intolerance   • Topiramate      Malaise      Past Medical History:   Diagnosis Date   • Anemia 1956    type unknown   • Anesthesia complication     patient states after her gallbladder she was hard to wake up and the anesthesiologist told her she was a \"light weight\"   • Arrhythmia 1978?    PAT   • Arthritis    • BCC (basal cell carcinoma of skin) Remote   • Bicuspid aortic valve 11/8/2018   • Cervicalgia 2009    Moderately persistent   • Chronic anxiety    • Chronic gastritis 2016   • Dizziness    • GERD (gastroesophageal reflux disease)    • Heart murmur    • HLD (hyperlipidemia)    • HTN (hypertension) 2013   • Infectious mononucleosis 1972    Transient pericarditis   • Irritable bowel syndrome 2016    Improved with FODMAP guidelines   • Migraine    • Migraines Lifelong    marked light sensitivity   • Mononucleosis 1972    with associated percarditis   • Paroxysmal atrial tachycardia (CMS/HCC)    • Peptic ulcer 1963   • Pericarditis     with mono   • RLS (restless legs syndrome) 2010   • Sleep apnea 2015    Failed CPAP   • SVT (supraventricular tachycardia) (CMS/HCC) Adulthood    With PAT   • Transient global amnesia 2005, 2017    with negative neurology workup   • Transient global amnesia    • Valvular disease 1980?    Bicuspid aortic valve   • Vitamin B deficiency    • Vitamin D deficiency      Past Surgical History:   Procedure Laterality Date   • BASAL CELL CARCINOMA EXCISION  " remote    arms   • BREAST BIOPSY Right     MRI   • BREAST EXCISIONAL BIOPSY Bilateral    • BREAST SURGERY  1970    biopsy done - benign   • CARDIAC CATHETERIZATION N/A 2018    Procedure: Left Heart Cath;  Surgeon: Mandy Henry MD;  Location: [facility] CATH INVASIVE LOCATION;  Service: Cardiovascular   • CHOLECYSTECTOMY     • COLONOSCOPY     • ENDOSCOPY     • LASIK      corneal LASIK - persistent dry     Family History   Problem Relation Age of Onset   • Heart failure Mother          age 77   • Arrhythmia Mother    • Diabetes Father    • Stroke Father          age 78   • Arthritis Maternal Grandmother    • Alzheimer's disease Paternal Grandmother    • Dementia Paternal Grandmother    • No Known Problems Sister    • Breast cancer Neg Hx    • Ovarian cancer Neg Hx       Social History     Socioeconomic History   • Marital status:      Spouse name: Not on file   • Number of children: Not on file   • Years of education: Not on file   • Highest education level: Not on file   Occupational History   • Occupation: Retired     Comment:    Tobacco Use   • Smoking status: Never Smoker   • Smokeless tobacco: Never Used   Substance and Sexual Activity   • Alcohol use: No   • Drug use: No   • Sexual activity: Yes     Partners: Male     Birth control/protection: Post-menopausal   Social History Narrative    Domestic life:   Lives in private home with         Amish:   Rastafarian        Sleep hygiene:   6 hours/night        Caffeine use:   None        Exercise habits:   Stationary cycle and PT exercises 30 minutes 6 days weekly - walks several days weekly        Diet:     Well-balanced, no almonds, soy or fish        Occupation:   Retired         Hearing    No impairment        Vision     corrects with reading glasses        Driving     no limitations         Review of Systems   Constitutional: Negative.    HENT: Negative.    Eyes: Negative.    Respiratory:  "Negative.    Cardiovascular: Negative.    Gastrointestinal: Negative.    Endocrine: Negative.    Genitourinary: Negative.    Musculoskeletal: Positive for arthralgias.   Skin: Negative.    Allergic/Immunologic: Negative.    Neurological: Negative.    Hematological: Negative.    Psychiatric/Behavioral: Negative.        The following portions of the patient's history were reviewed and updated as appropriate: allergies, current medications, past family history, past medical history, past social history, past surgical history and problem list.    Physical Exam:   Pulse 88   Ht 154.9 cm (60.98\")   Wt 51.7 kg (113 lb 15.7 oz)   SpO2 97%   BMI 21.55 kg/m²   GENERAL: Body habitus: normal weight for height    Lower extremity edema: Left: none; Right: none    Gait: normal     Mental Status:  awake and alert; oriented to person, place, and time  MSK:          Foot:  Right:  mucus cyst on dorsal-medial 3rd toe DIP joint, some thick skin over top, no sign of infection, mildly tender; Left:  non tender    NEURO Sensation:  intact    Medical Decision Making    Data Review:   none    Assessment/Plan/Diagnosis/Treatment Options:   1. Toe pain, right  Mucus cyst right 3rd toe.  We discussed the options.  One option is observation, but she reports that it bothers her and hurts.  Another option is aspiration and injection, about 50% of the time this will make the cyst resorbed.  A third option is surgical.  I do not think we need to do an MRI because it has no ominous characteristics.  We talked about the pros and cons.  She would like to try the aspiration and injection.  If it does not cause resorption she will return and we will talk about surgical intervention        After discussing the risks (including infection, skin atrophy, etc), time out was called,  the mucous cyst on the third toe right foot  was prepped and using sterile technique, using an 18G needle,  I aspirated 0.10 cc of apple jelly like fluid, confirming the " diagnosis.  I then injected  0.25cc (10mg) DepoMedrol with a 27G needle.  A band aid was applied.  No complications.       - Small Joint Arthrocentesis        Izabel Rubalcava MD

## 2021-01-15 NOTE — PROGRESS NOTES
Procedure   Small Joint Arthrocentesis  Consent given by: patient  Site marked: site marked  Timeout: Immediately prior to procedure a time out was called to verify the correct patient, procedure, equipment, support staff and site/side marked as required   Supporting Documentation  Indications: pain   Procedure Details  Location: second toe - Second toe joint: Right second toe cyst aspiration and injection   Preparation: Patient was prepped and draped in the usual sterile fashion  Needle size: 22 G  Approach: dorsal  Medications administered: 10 mg methylPREDNISolone acetate 40 MG/ML  Aspirate amount: 0.3 mL  Patient tolerance: patient tolerated the procedure well with no immediate complications

## 2021-01-25 ENCOUNTER — TELEPHONE (OUTPATIENT)
Dept: CARDIOLOGY | Facility: CLINIC | Age: 71
End: 2021-01-25

## 2021-01-25 DIAGNOSIS — I77.810 AORTIC ROOT DILATION (HCC): Primary | ICD-10-CM

## 2021-01-25 NOTE — TELEPHONE ENCOUNTER
----- Message from CULLEN Alejandre sent at 1/22/2021  9:51 AM EST -----  Done. Can you place order the CT? I do not want to mess it up. Was hoping to have it done late June/early July before her 7/7 appointment.     ----- Message -----  From: Aliza Carmona RN  Sent: 1/22/2021   8:52 AM EST  To: CULLEN Alejandre    Did you call this pt?  ----- Message -----  From: Fabiola Gamez APRN  Sent: 1/20/2021   9:46 AM EST  To: CULLEN Alejandre, #    Will get a CT July 2021. If stable we may be able to push out longer. Will get echo in 2022 to follow up on valve. I can call her tomorrow.   ----- Message -----  From: Aliza Carmona RN  Sent: 1/19/2021  10:47 AM EST  To: CULLEN Alejandre    Please call this patient and let her know what you found out from [facility] - she saw you 12/23. The note is not done

## 2021-04-20 ENCOUNTER — HOSPITAL ENCOUNTER (OUTPATIENT)
Dept: CT IMAGING | Facility: HOSPITAL | Age: 71
Discharge: HOME | End: 2021-04-20
Admitting: INTERNAL MEDICINE

## 2021-04-20 DIAGNOSIS — I77.810 AORTIC ROOT DILATION (HCC): ICD-10-CM

## 2021-04-20 PROCEDURE — 71275 CT ANGIOGRAPHY CHEST: CPT

## 2021-04-20 PROCEDURE — 82565 ASSAY OF CREATININE: CPT

## 2021-04-20 PROCEDURE — 0 IOPAMIDOL PER 1 ML: Performed by: INTERNAL MEDICINE

## 2021-04-20 RX ADMIN — IOPAMIDOL 75 ML: 755 INJECTION, SOLUTION INTRAVENOUS at 09:30

## 2021-04-21 ENCOUNTER — OFFICE VISIT (OUTPATIENT)
Dept: ORTHOPEDIC SURGERY | Facility: CLINIC | Age: 71
End: 2021-04-21

## 2021-04-21 VITALS
WEIGHT: 109 LBS | DIASTOLIC BLOOD PRESSURE: 97 MMHG | HEART RATE: 57 BPM | HEIGHT: 61 IN | BODY MASS INDEX: 20.58 KG/M2 | SYSTOLIC BLOOD PRESSURE: 163 MMHG

## 2021-04-21 DIAGNOSIS — M79.672 LEFT FOOT PAIN: Primary | ICD-10-CM

## 2021-04-21 PROCEDURE — 99212 OFFICE O/P EST SF 10 MIN: CPT | Performed by: ORTHOPAEDIC SURGERY

## 2021-04-21 NOTE — PROGRESS NOTES
ESTABLISHED PATIENT    Patient: Nicki Kang  : 1950    Primary Care Provider: Domo Zamora MD    Requesting Provider: As above    Follow-up (3 month recheck - Mucus cyst right 3rd toe)      History    Chief Complaint: Left toe pain    History of Present Illness: She returns with a new problem.  The mucous cyst on the right third toe that we aspirated and injected at last visit, is very small and not bothering her anymore.  She has noted a new problem with the left second toe.  She also has a small problem with the right second toe.  She has noted a painful bump on the medial aspect of the left second toe DIP joint and has been wondering if it is a mucous cyst.  She does wear good shoes with orthotics.    Current Outpatient Medications on File Prior to Visit   Medication Sig Dispense Refill   • ACETAZOLAMIDE PO Take 75 mg by mouth Daily.     • cholecalciferol (VITAMIN D3) 25 MCG (1000 UT) tablet Take 1,000 Units by mouth Daily.     • Cyanocobalamin (VITAMIN B12) 1000 MCG tablet controlled-release Take  by mouth Daily.     • diclofenac (Voltaren) 1 % gel gel Voltaren 1 % topical gel   prn     • ethacrynic acid (EDECRIN) 25 MG tablet ethacrynic acid 25 mg tablet   TAKE ONE-HALF TABLET BY MOUTH DAILY FOR 30 DAYS     • Magnesium Oxide 500 MG tablet magnesium oxide 500 mg tablet   Take 1 tablet every day by oral route.     • minoxidil (LONITEN) 2.5 MG tablet Take 1.25 mg by mouth 3 (Three) Times a Day.     • Multiple Vitamins-Minerals (MULTIVITAMIN ADULT PO) Take  by mouth Daily.     • OXcarbazepine (TRILEPTAL) 150 MG tablet Take 75 mg by mouth 2 (Two) Times a Day.     • pravastatin (PRAVACHOL) 20 MG tablet Take 20 mg by mouth Daily. Starting after      • propafenone (RYTHMOL) 300 MG tablet Take 300 mg by mouth As Needed.     • [DISCONTINUED] Menaquinone-7 (VITAMIN K2) 40 MCG tablet Take  by mouth Daily.     • [DISCONTINUED] niacin (SLO-NIACIN) 500 MG CR tablet Take 500 mg by  "mouth Every Night.       No current facility-administered medications on file prior to visit.      Allergies   Allergen Reactions   • Losartan Shortness Of Breath     Tongue swelling    • Nuts Shortness Of Breath   • Soybean-Containing Drug Products Shortness Of Breath   • Piroxicam      Lethargy  and speech disturbance   • Amlodipine Other (See Comments)     Fatigue, weakness, headache   • Cardura [Doxazosin Mesylate] Other (See Comments)     headache   • Guanfacine Swelling   • Hydralazine Swelling   • Nefazodone Arrhythmia   • Soy Isoflavones    • Verapamil Other (See Comments)     Did not control BP  Depression    • Chlorthalidone      Fatigue and GI distress   • Codeine      Malaise   • Gabapentin Mental Status Change   • Metoprolol      Migraines and fatigue   • Paroxetine Irritability     Panic attack   • Ranitidine GI Intolerance   • Topiramate      Malaise      Past Medical History:   Diagnosis Date   • Anemia 1956    type unknown   • Anesthesia complication     patient states after her gallbladder she was hard to wake up and the anesthesiologist told her she was a \"light weight\"   • Arrhythmia 1978?    PAT   • Arthritis    • BCC (basal cell carcinoma of skin) Remote   • Bicuspid aortic valve 11/8/2018   • Cervicalgia 2009    Moderately persistent   • Chronic anxiety    • Chronic gastritis 2016   • Dizziness    • GERD (gastroesophageal reflux disease)    • Heart murmur    • HLD (hyperlipidemia)    • HTN (hypertension) 2013   • Infectious mononucleosis 1972    Transient pericarditis   • Irritable bowel syndrome 2016    Improved with FODMAP guidelines   • Migraine    • Migraines Lifelong    marked light sensitivity   • Mononucleosis 1972    with associated percarditis   • Paroxysmal atrial tachycardia (CMS/HCC)    • Peptic ulcer 1963   • Pericarditis     with mono   • RLS (restless legs syndrome) 2010   • Sleep apnea 2015    Failed CPAP   • SVT (supraventricular tachycardia) (CMS/HCC) Adulthood    With PAT "   • Transient global amnesia ,     with negative neurology workup   • Transient global amnesia    • Valvular disease ?    Bicuspid aortic valve   • Vitamin B deficiency    • Vitamin D deficiency      Past Surgical History:   Procedure Laterality Date   • BASAL CELL CARCINOMA EXCISION  remote    arms   • BREAST BIOPSY Right     MRI   • BREAST EXCISIONAL BIOPSY Bilateral    • BREAST SURGERY  1970    biopsy done - benign   • CARDIAC CATHETERIZATION N/A 2018    Procedure: Left Heart Cath;  Surgeon: Mandy Henry MD;  Location: [facility] CATH INVASIVE LOCATION;  Service: Cardiovascular   • CHOLECYSTECTOMY     • COLONOSCOPY     • ENDOSCOPY     • LASIK      corneal LASIK - persistent dry     Family History   Problem Relation Age of Onset   • Heart failure Mother          age 77   • Arrhythmia Mother    • Diabetes Father    • Stroke Father          age 78   • Arthritis Maternal Grandmother    • Alzheimer's disease Paternal Grandmother    • Dementia Paternal Grandmother    • No Known Problems Sister    • Breast cancer Neg Hx    • Ovarian cancer Neg Hx       Social History     Socioeconomic History   • Marital status:      Spouse name: Not on file   • Number of children: Not on file   • Years of education: Not on file   • Highest education level: Not on file   Tobacco Use   • Smoking status: Never Smoker   • Smokeless tobacco: Never Used   Substance and Sexual Activity   • Alcohol use: No   • Drug use: No   • Sexual activity: Yes     Partners: Male     Birth control/protection: Post-menopausal        Review of Systems   Constitutional: Negative.    HENT: Negative.    Eyes: Negative.    Respiratory: Negative.    Cardiovascular: Negative.    Gastrointestinal: Negative.    Endocrine: Negative.    Genitourinary: Negative.    Musculoskeletal: Positive for arthralgias.   Skin: Negative.    Allergic/Immunologic: Negative.    Neurological: Negative.    Hematological: Negative.   "  Psychiatric/Behavioral: Negative.        The following portions of the patient's history were reviewed and updated as appropriate: allergies, current medications, past family history, past medical history, past social history, past surgical history and problem list.    Physical Exam:   /97   Pulse 57   Ht 154.9 cm (60.98\")   Wt 49.4 kg (109 lb)   BMI 20.61 kg/m²   GENERAL: Body habitus: normal weight for height    Lower extremity edema: Left: none; Right: none    Gait: normal     Mental Status:  awake and alert; oriented to person, place, and time  MSK:  [place]:  Right:  non tender; Left:  non tender        Ankle:  Right: non tender; Left:  non tender        Foot:  Right:  Very small corn on medial DIP joint second toe, third toe mucous cyst is flat with no obvious fluid collection; Left:  Small tender corn on the medial aspect of the second toe PIP joint    NEURO Sensation:  intact    Medical Decision Making    Data Review:   none    Assessment/Plan/Diagnosis/Treatment Options:   1. Left foot pain  I reassured her that this is not a mucous cyst.  It is a corn.  I explained corns, they are due to pressure.  Her great toe is stiff and angles moderately into valgus, so it rubs against the second toe.  Similar but milder on the right side.  I showed her how to use a silicone toe spacer, as well as a silicone sleeve.  We talked about how to keep the corn thin and flat with trimming it.  I explained that its not a tumor like the mucous cyst, but rather a pressure point.  I explained her feet change with time.  I will be happy to see her anytime.        Izabel Rubalcava MD                      "

## 2021-04-22 LAB — CREAT BLDA-MCNC: 0.7 MG/DL (ref 0.6–1.3)

## 2021-05-05 ENCOUNTER — OFFICE VISIT (OUTPATIENT)
Dept: CARDIOLOGY | Facility: CLINIC | Age: 71
End: 2021-05-05

## 2021-05-05 VITALS
HEIGHT: 61 IN | OXYGEN SATURATION: 98 % | SYSTOLIC BLOOD PRESSURE: 128 MMHG | BODY MASS INDEX: 21.14 KG/M2 | DIASTOLIC BLOOD PRESSURE: 74 MMHG | HEART RATE: 87 BPM | WEIGHT: 112 LBS

## 2021-05-05 DIAGNOSIS — I77.810 AORTIC ROOT DILATION (HCC): ICD-10-CM

## 2021-05-05 DIAGNOSIS — Q23.1 BICUSPID AORTIC VALVE: Primary | ICD-10-CM

## 2021-05-05 DIAGNOSIS — I47.1 PAROXYSMAL SUPRAVENTRICULAR TACHYCARDIA (HCC): ICD-10-CM

## 2021-05-05 DIAGNOSIS — I10 ESSENTIAL HYPERTENSION: ICD-10-CM

## 2021-05-05 PROCEDURE — 99214 OFFICE O/P EST MOD 30 MIN: CPT | Performed by: INTERNAL MEDICINE

## 2021-05-05 PROCEDURE — 93000 ELECTROCARDIOGRAM COMPLETE: CPT | Performed by: NURSE PRACTITIONER

## 2021-05-05 RX ORDER — LORAZEPAM 0.5 MG/1
TABLET ORAL 2 TIMES DAILY PRN
COMMUNITY
Start: 2021-04-28

## 2021-05-05 RX ORDER — FERROUS SULFATE 325(65) MG
325 TABLET ORAL
COMMUNITY

## 2021-05-05 NOTE — PROGRESS NOTES
[facility] Cardiology    Patient ID: Nicki Kang is a 71 y.o. female.  : 1950   Contact: [phone]    Encounter date: 2021    PCP: Domo Zamora MD      Chief complaint:   Chief Complaint   Patient presents with   • Bicuspid aortic valve     Problem List:  1. Paroxysmal atrial tachycardia (PAT):  a. Diagnosed .  b. Verapamil prescribed to use p.r.n., but patient discontinued secondary to fatigue.  c. GXT, 05/15/1996, showing PVCs in rest and recovery with no sustained arrhythmias or ischemia.  d. Reported last 24-hour Holter monitor, .  e. Reported stress echo 5 years ago, normal;  data deficit.  f. Event monitor, 2008, showing several brief runs of atrial tachycardia.  2.  Functionally bicuspid aortic valve:  a. Echo, 1998: Moderate thickening of the mitral valve without MVP, with mild MR. The aortic valve was probably tri-leaflet with no thickening, minimal AI and EF 60%.  b. Echo, 2008: EF 55%. Tri-leaflet aortic valve with trace AI, mild-to-moderate MR/TR  c. Echo, 2014: EF 55-60% with mild MR/TR, and a trial leaflet appearing aortic valve.   d. Stress echo, 10/18/2017: EF 70%. A functionally bicuspid valve with fused left-sided cusps cannot be excluded. Mild-to-moderate AS. Mild TR/MR. Good exercise tolerance with an expected duration of 6:10, actual duration of 7:24. No evidence of inducible ischemia.  e. C, 2018, for unstable angina: Normal coronary arteries. Possible dilation of the ascending aorta.  f. Echo, 2018: EF 55-60%. Aortic valve appears to be bicuspid with fusion of RCC and LCC. Mild AS with mean gradient 13 mmHg. Trace AI. Mild MR/TR.   g. Echo 20: EF 60%, mild AS, mean pressure gradient 15 mmHg. Mild MR. Mild TR. RVSP 21 mmHg.   3. Dilated ascending aorta  a. CTA chest, 2018: Ascending thoracic aortic dimension of 4.2 cm.  b. CTA chest, 07/10/2019: Ascending thoracic aortic  dimension of 4.0 cm. No evidence of acute intrathoracic abnormality.  c. CTA chest, 4/20/2021: ascending thoracic aortic dimension of 4.1cm.   4. Dyslipidemia:   a. Diet controlled.  5. Hypertension:  a. Renal artery US, 6/16/2016: No significant renal arterial stenosis. Normal parenchymal blood flow patterns.  6. Pericarditis, 1972.  7. NOEL.  8. Anxiety.  9. Migraine headaches.  10. GERD.  11. Restless leg syndrome.  12. Insomnia.  13. Surgical history:  a. Breast biopsy.  b. Benign lymph node removal.  c. Cholecystectomy.    Allergies   Allergen Reactions   • Losartan Shortness Of Breath     Tongue swelling    • Nuts Shortness Of Breath   • Soybean-Containing Drug Products Shortness Of Breath   • Piroxicam      Lethargy  and speech disturbance   • Acetazolamide Irritability     Tingling, fatigue and vertigo   • Amlodipine Other (See Comments)     Fatigue, weakness, headache   • Cardura [Doxazosin Mesylate] Other (See Comments)     headache   • Guanfacine Swelling   • Hydralazine Swelling   • Nefazodone Arrhythmia   • Soy Isoflavones    • Verapamil Other (See Comments)     Did not control BP  Depression    • Chlorthalidone      Fatigue and GI distress   • Codeine      Malaise   • Gabapentin Mental Status Change   • Metoprolol      Migraines and fatigue   • Paroxetine Irritability     Panic attack   • Ranitidine GI Intolerance   • Topiramate      Malaise       Current Medications:    Current Outpatient Medications:   •  cholecalciferol (VITAMIN D3) 25 MCG (1000 UT) tablet, Take 1,000 Units by mouth Daily., Disp: , Rfl:   •  Cyanocobalamin (VITAMIN B12) 1000 MCG tablet controlled-release, Take  by mouth Daily., Disp: , Rfl:   •  diclofenac (Voltaren) 1 % gel gel, Voltaren 1 % topical gel  prn, Disp: , Rfl:   •  ethacrynic acid (EDECRIN) 25 MG tablet, ethacrynic acid 25 mg tablet  TAKE ONE-HALF TABLET BY MOUTH DAILY FOR 30 DAYS, Disp: , Rfl:   •  ferrous sulfate 325 (65 FE) MG tablet, Take 325 mg by mouth Daily With  "Breakfast., Disp: , Rfl:   •  LORazepam (ATIVAN) 0.5 MG tablet, 2 (Two) Times a Day As Needed., Disp: , Rfl:   •  Magnesium Oxide 500 MG tablet, 1,000 mg Daily., Disp: , Rfl:   •  minoxidil (LONITEN) 2.5 MG tablet, Take 1.25 mg by mouth 3 (Three) Times a Day., Disp: , Rfl:   •  Multiple Vitamins-Minerals (MULTIVITAMIN ADULT PO), Take  by mouth Daily., Disp: , Rfl:   •  OXcarbazepine (TRILEPTAL) 150 MG tablet, Take 75 mg by mouth 2 (Two) Times a Day. 1/4 tab in am and 1/2 tab in pm, Disp: , Rfl:   •  pravastatin (PRAVACHOL) 20 MG tablet, Take 20 mg by mouth Daily. Starting after christmas12/23/2020, Disp: , Rfl:   •  propafenone (RYTHMOL) 300 MG tablet, Take 300 mg by mouth As Needed., Disp: , Rfl:     HPI    Nicki Kang is a 71 y.o. female who presents today for a follow up of aortic root dilation, bicuspid aortic valve, and essential hypertension. Since last visit, patient has done well overall. She has had some episodes of atrial tachycardia. She denies chest pain, shortness of breath, lightheadedness, dizziness. Occasional near syncope in association with atrial tachycardia. No marlen syncope.     The following portions of the patient's history were reviewed and updated as appropriate: allergies, current medications and problem list.    Pertinent positives as listed in the HPI.  All other systems reviewed are negative.         Vitals:    05/05/21 1553   BP: 128/74   BP Location: Left arm   Patient Position: Sitting   Pulse: 87   SpO2: 98%   Weight: 50.8 kg (112 lb)   Height: 154.9 cm (61\")       Physical Exam:  General: Alert and oriented.  Neck: Jugular venous pressure is within normal limits. Carotids have normal upstrokes without bruits.   Cardiovascular: Heart has a nondisplaced focal PMI. Regular rate and rhythm. 2/6 LAURA at RUSB, no gallop or rub.  Lungs: Clear, no rales or wheezes. Equal expansion is noted.   Extremities: Show no edema.  Skin: Warm and dry.  Neurologic: Nonfocal.     Diagnostic " Data (reviewed with patient):  Lab date: 03/30/2021  • FLP: , TG 53, HDL 63, LDL 96  • CMP: Glu 79, BUN 20, Creat 0.77, eGFR 78, Na 140, K 4,2, Cl 102, CO2 24, Ca 9.7, Alk Phos 100, AST 24, ALT 25  • CBC: WBC 4.7, RBC 4.43, HGB 12.5, HCT 39, MCV 88, MCH 28.2,        ECG 12 Lead    Date/Time: 5/5/2021 4:11 PM  Performed by: Fabiola Gamez APRN  Authorized by: Fabiola Gamez APRN   Comparison: compared with previous ECG from 12/14/2017  Rhythm: sinus arrhythmia  BPM: 64  Comments: QRS 80 ms              Assessment:    ICD-10-CM ICD-9-CM   1. Bicuspid aortic valve  Q23.1 746.4   2. Aortic root dilation (CMS/HCC)  I77.810 447.71   3. Essential hypertension  I10 401.9   4. Paroxysmal supraventricular tachycardia (CMS/HCC)  I47.1 427.0         Plan:  1. Continue Rythmol for PAT.   2. Continue minoxidil for HTN.   3. Patient was counseled to begin aerobic exercise 30 min per day for at least 4 days per week.   4. Discussed findings of CTA chest with patient and reassured her that ascending aortic aneurysm has remained stable.   5. Continue all other current medications.  6. F/up in PRN (patient moving to [place] and will establish cardiology care there).         Scribed for Mandy Henry MD by CULLEN Gurrola. 5/5/2021  16:11 EDT     I Mandy Henry MD personally performed the services described in this documentation as scribed by the above individual in my presence, and it is both accurate and complete.    Mandy Henry MD, [facility]

## 2022-07-22 ENCOUNTER — APPOINTMENT (OUTPATIENT)
Dept: URBAN - METROPOLITAN AREA CLINIC 191 | Age: 72
Setting detail: DERMATOLOGY
End: 2022-07-22

## 2022-07-22 DIAGNOSIS — L81.4 OTHER MELANIN HYPERPIGMENTATION: ICD-10-CM

## 2022-07-22 DIAGNOSIS — L98.8 OTHER SPECIFIED DISORDERS OF THE SKIN AND SUBCUTANEOUS TISSUE: ICD-10-CM

## 2022-07-22 DIAGNOSIS — Z85.828 PERSONAL HISTORY OF OTHER MALIGNANT NEOPLASM OF SKIN: ICD-10-CM

## 2022-07-22 DIAGNOSIS — D22 MELANOCYTIC NEVI: ICD-10-CM

## 2022-07-22 DIAGNOSIS — L82.1 OTHER SEBORRHEIC KERATOSIS: ICD-10-CM

## 2022-07-22 DIAGNOSIS — L72.8 OTHER FOLLICULAR CYSTS OF THE SKIN AND SUBCUTANEOUS TISSUE: ICD-10-CM

## 2022-07-22 PROBLEM — D22.72 MELANOCYTIC NEVI OF LEFT LOWER LIMB, INCLUDING HIP: Status: ACTIVE | Noted: 2022-07-22

## 2022-07-22 PROBLEM — D22.0 MELANOCYTIC NEVI OF LIP: Status: ACTIVE | Noted: 2022-07-22

## 2022-07-22 PROBLEM — D22.5 MELANOCYTIC NEVI OF TRUNK: Status: ACTIVE | Noted: 2022-07-22

## 2022-07-22 PROCEDURE — OTHER DEFER: OTHER

## 2022-07-22 PROCEDURE — OTHER OBSERVATION: OTHER

## 2022-07-22 PROCEDURE — OTHER PRESCRIPTION MEDICATION MANAGEMENT: OTHER

## 2022-07-22 PROCEDURE — 99203 OFFICE O/P NEW LOW 30 MIN: CPT

## 2022-07-22 PROCEDURE — OTHER COUNSELING: OTHER

## 2022-07-22 ASSESSMENT — LOCATION SIMPLE DESCRIPTION DERM
LOCATION SIMPLE: LEFT POPLITEAL SKIN
LOCATION SIMPLE: LEFT CHEEK
LOCATION SIMPLE: RIGHT FOREARM
LOCATION SIMPLE: LEFT AXILLARY VAULT
LOCATION SIMPLE: LEFT UPPER BACK
LOCATION SIMPLE: RIGHT UPPER BACK
LOCATION SIMPLE: CHIN
LOCATION SIMPLE: RIGHT AXILLARY VAULT
LOCATION SIMPLE: LEFT LIP
LOCATION SIMPLE: LEFT FOREARM
LOCATION SIMPLE: LOWER BACK

## 2022-07-22 ASSESSMENT — LOCATION DETAILED DESCRIPTION DERM
LOCATION DETAILED: SUPERIOR LUMBAR SPINE
LOCATION DETAILED: LEFT CHIN
LOCATION DETAILED: RIGHT PROXIMAL DORSAL FOREARM
LOCATION DETAILED: LEFT AXILLARY VAULT
LOCATION DETAILED: LEFT UPPER CUTANEOUS LIP
LOCATION DETAILED: LEFT POPLITEAL SKIN
LOCATION DETAILED: RIGHT AXILLARY VAULT
LOCATION DETAILED: LEFT INFERIOR CENTRAL MALAR CHEEK
LOCATION DETAILED: LEFT SUPERIOR UPPER BACK
LOCATION DETAILED: RIGHT MEDIAL UPPER BACK
LOCATION DETAILED: LEFT PROXIMAL DORSAL FOREARM

## 2022-07-22 ASSESSMENT — LOCATION ZONE DERM
LOCATION ZONE: LIP
LOCATION ZONE: LEG
LOCATION ZONE: AXILLAE
LOCATION ZONE: TRUNK
LOCATION ZONE: FACE
LOCATION ZONE: ARM

## 2022-07-22 NOTE — PROCEDURE: DEFER
Detail Level: Detailed
Instructions (Optional): Patient will have 2 moles removed in the fall
Procedure To Be Performed At Next Visit: Shave Removal
Introduction Text (Please End With A Colon): The following procedure was deferred:

## 2022-07-22 NOTE — PROCEDURE: COUNSELING
Patient Specific Counseling (Will Not Stick From Patient To Patient): Discussed microneedling vs filler vs laser. Patient is considering options. Recommended Dr. Winston at [place] location for laser consult. Patient Specific Counseling (Will Not Stick From Patient To Patient): Discussed microneedling vs filler vs laser. Patient is considering options. Recommended Dr. Winston at [place] location for laser consult.

## 2022-07-22 NOTE — HPI: EVALUATION OF SKIN LESION(S)
What Type Of Note Output Would You Prefer (Optional)?: Bullet Format
Hpi Title: Evaluation of Skin Lesions
Additional History: She recently moved from [place], [place] and this is her first visit to the clinic. Previous records received.
Additional History: Patient reports some irritating skin tags that she would like removed today.

## 2022-07-22 NOTE — PROCEDURE: PRESCRIPTION MEDICATION MANAGEMENT
Detail Level: Simple
Render In Strict Bullet Format?: No
Plan: Apply mupirocin ointment three times daily for 5 days (patient has Rx at home)

## 2022-11-17 ENCOUNTER — APPOINTMENT (OUTPATIENT)
Dept: URBAN - METROPOLITAN AREA CLINIC 191 | Age: 72
Setting detail: DERMATOLOGY
End: 2022-11-18

## 2022-11-17 DIAGNOSIS — D485 NEOPLASM OF UNCERTAIN BEHAVIOR OF SKIN: ICD-10-CM

## 2022-11-17 DIAGNOSIS — L82.0 INFLAMED SEBORRHEIC KERATOSIS: ICD-10-CM

## 2022-11-17 PROBLEM — D48.5 NEOPLASM OF UNCERTAIN BEHAVIOR OF SKIN: Status: ACTIVE | Noted: 2022-11-17

## 2022-11-17 PROCEDURE — OTHER MIPS QUALITY: OTHER

## 2022-11-17 PROCEDURE — 17110 DESTRUCT B9 LESION 1-14: CPT

## 2022-11-17 PROCEDURE — OTHER BIOPSY BY SHAVE METHOD: OTHER

## 2022-11-17 PROCEDURE — 11102 TANGNTL BX SKIN SINGLE LES: CPT | Mod: 59

## 2022-11-17 PROCEDURE — 11103 TANGNTL BX SKIN EA SEP/ADDL: CPT | Mod: 59

## 2022-11-17 PROCEDURE — OTHER LIQUID NITROGEN: OTHER

## 2022-11-17 ASSESSMENT — LOCATION SIMPLE DESCRIPTION DERM
LOCATION SIMPLE: LEFT LIP
LOCATION SIMPLE: LEFT POPLITEAL SKIN
LOCATION SIMPLE: LEFT FOREHEAD
LOCATION SIMPLE: LEFT LIP

## 2022-11-17 ASSESSMENT — LOCATION DETAILED DESCRIPTION DERM
LOCATION DETAILED: LEFT UPPER CUTANEOUS LIP
LOCATION DETAILED: LEFT INFERIOR MEDIAL FOREHEAD
LOCATION DETAILED: LEFT POPLITEAL SKIN
LOCATION DETAILED: LEFT UPPER CUTANEOUS LIP

## 2022-11-17 ASSESSMENT — LOCATION ZONE DERM
LOCATION ZONE: FACE
LOCATION ZONE: LIP
LOCATION ZONE: LIP
LOCATION ZONE: LEG

## 2022-11-17 NOTE — PROCEDURE: LIQUID NITROGEN
Medical Necessity Clause: This procedure was medically necessary because the lesions that were treated were:
Show Applicator Variable?: No
Duration Of Freeze Thaw-Cycle (Seconds): 5-10
Number Of Freeze-Thaw Cycles: 1 freeze-thaw cycle
Medical Necessity Information: It is in your best interest to select a reason for this procedure from the list below. All of these items fulfill various CMS LCD requirements except the new and changing color options.
Spray Paint Text: The liquid nitrogen was applied to the skin utilizing a spray paint frosting technique.
Detail Level: Detailed
Consent: The patient's consent was obtained including but not limited to risks of crusting, scabbing, blistering, scarring, darker or lighter pigmentary change, recurrence, incomplete removal and infection.
Post-Care Instructions: I reviewed with the patient in detail post-care instructions. Patient is to wear sunprotection, and avoid picking at any of the treated lesions. Pt may apply Vaseline to crusted or scabbing areas.

## (undated) DEVICE — PK CATH CARD 10

## (undated) DEVICE — CATH DIAG EXPO .056 FL5 6F 100CM

## (undated) DEVICE — CATH DIAG EXPO M/ PK 6FR FL4/FR4 PIG 3PK

## (undated) DEVICE — INTRO SHEATH PRELUDE IDEAL SPRNG COIL .021 6F 16X80CM

## (undated) DEVICE — CATH DIAG EXPO .056 FL3.5 6F 100CM

## (undated) DEVICE — Device

## (undated) DEVICE — CANNULA,ADULT,SOFT-TOUCH,7'TUBE,UC: Brand: PENDING

## (undated) DEVICE — PINNACLE INTRODUCER SHEATH: Brand: PINNACLE

## (undated) DEVICE — KT MANIFOLD CATHLAB CUST